# Patient Record
Sex: FEMALE | Race: WHITE | NOT HISPANIC OR LATINO | Employment: FULL TIME | URBAN - METROPOLITAN AREA
[De-identification: names, ages, dates, MRNs, and addresses within clinical notes are randomized per-mention and may not be internally consistent; named-entity substitution may affect disease eponyms.]

---

## 2019-01-13 ENCOUNTER — OFFICE VISIT (OUTPATIENT)
Dept: URGENT CARE | Facility: CLINIC | Age: 34
End: 2019-01-13
Payer: MEDICAID

## 2019-01-13 VITALS
DIASTOLIC BLOOD PRESSURE: 82 MMHG | SYSTOLIC BLOOD PRESSURE: 111 MMHG | BODY MASS INDEX: 35.7 KG/M2 | HEIGHT: 62 IN | HEART RATE: 84 BPM | TEMPERATURE: 98 F | OXYGEN SATURATION: 99 % | WEIGHT: 194 LBS

## 2019-01-13 DIAGNOSIS — H66.001 ACUTE SUPPURATIVE OTITIS MEDIA OF RIGHT EAR WITHOUT SPONTANEOUS RUPTURE OF TYMPANIC MEMBRANE, RECURRENCE NOT SPECIFIED: Primary | ICD-10-CM

## 2019-01-13 PROCEDURE — 99204 PR OFFICE/OUTPT VISIT, NEW, LEVL IV, 45-59 MIN: ICD-10-PCS | Mod: S$GLB,,, | Performed by: PHYSICIAN ASSISTANT

## 2019-01-13 PROCEDURE — 99204 OFFICE O/P NEW MOD 45 MIN: CPT | Mod: S$GLB,,, | Performed by: PHYSICIAN ASSISTANT

## 2019-01-13 RX ORDER — CETIRIZINE HYDROCHLORIDE, PSEUDOEPHEDRINE HYDROCHLORIDE 5; 120 MG/1; MG/1
1 TABLET, FILM COATED, EXTENDED RELEASE ORAL DAILY
Qty: 14 TABLET | Refills: 0 | Status: SHIPPED | OUTPATIENT
Start: 2019-01-13 | End: 2019-01-27

## 2019-01-13 RX ORDER — AMOXICILLIN AND CLAVULANATE POTASSIUM 875; 125 MG/1; MG/1
1 TABLET, FILM COATED ORAL 2 TIMES DAILY
Qty: 20 TABLET | Refills: 0 | Status: SHIPPED | OUTPATIENT
Start: 2019-01-13 | End: 2019-01-23

## 2019-01-13 RX ORDER — DEXAMETHASONE SODIUM PHOSPHATE 100 MG/10ML
10 INJECTION INTRAMUSCULAR; INTRAVENOUS
Status: COMPLETED | OUTPATIENT
Start: 2019-01-13 | End: 2019-01-13

## 2019-01-13 RX ADMIN — DEXAMETHASONE SODIUM PHOSPHATE 10 MG: 100 INJECTION INTRAMUSCULAR; INTRAVENOUS at 04:01

## 2019-01-13 NOTE — PROGRESS NOTES
"Subjective:       Patient ID: Janki Bernstein is a 33 y.o. female.    Vitals:  height is 5' 2" (1.575 m) and weight is 88 kg (194 lb). Her oral temperature is 97.6 °F (36.4 °C). Her blood pressure is 111/82 and her pulse is 84. Her oxygen saturation is 99%.     Chief Complaint: Otalgia    Otalgia    There is pain in the right ear. This is a new problem. Episode onset: 2 weeks ago. The problem occurs constantly. The problem has been unchanged. There has been no fever. The patient is experiencing no pain. Associated symptoms include hearing loss. Pertinent negatives include no abdominal pain, coughing, diarrhea, headaches, neck pain, rash, sore throat or vomiting. She has tried ear drops for the symptoms. The treatment provided no relief.       Constitution: Negative for chills, fatigue and fever.   HENT: Positive for ear pain and hearing loss. Negative for congestion and sore throat.    Neck: Negative for neck pain and painful lymph nodes.   Cardiovascular: Negative for chest pain and leg swelling.   Eyes: Negative for double vision and blurred vision.   Respiratory: Negative for cough and shortness of breath.    Gastrointestinal: Negative for abdominal pain, nausea, vomiting and diarrhea.   Genitourinary: Negative for dysuria, frequency, urgency and history of kidney stones.   Musculoskeletal: Negative for joint pain, joint swelling, muscle cramps and muscle ache.   Skin: Negative for color change, pale, rash and bruising.   Allergic/Immunologic: Negative for seasonal allergies.   Neurological: Negative for dizziness, history of vertigo, light-headedness, passing out and headaches.   Hematologic/Lymphatic: Negative for swollen lymph nodes.   Psychiatric/Behavioral: Negative for nervous/anxious, sleep disturbance and depression. The patient is not nervous/anxious.        Objective:      Physical Exam   Constitutional: She is oriented to person, place, and time. She appears well-developed and well-nourished. She " is cooperative.  Non-toxic appearance. She does not have a sickly appearance. She does not appear ill. No distress.   HENT:   Head: Normocephalic and atraumatic.   Right Ear: Hearing, external ear and ear canal normal. Tympanic membrane is erythematous. A middle ear effusion is present.   Left Ear: Hearing, tympanic membrane, external ear and ear canal normal.   Nose: Nose normal. No mucosal edema, rhinorrhea or nasal deformity. No epistaxis. Right sinus exhibits no maxillary sinus tenderness and no frontal sinus tenderness. Left sinus exhibits no maxillary sinus tenderness and no frontal sinus tenderness.   Mouth/Throat: Uvula is midline, oropharynx is clear and moist and mucous membranes are normal. No trismus in the jaw. Normal dentition. No uvula swelling. No oropharyngeal exudate, posterior oropharyngeal edema or posterior oropharyngeal erythema.   Eyes: Conjunctivae, EOM and lids are normal. Pupils are equal, round, and reactive to light. No scleral icterus.   Sclera clear bilat   Neck: Trachea normal, full passive range of motion without pain and phonation normal. Neck supple. No neck rigidity. No edema and no erythema present.   Cardiovascular: Normal rate, regular rhythm, normal heart sounds, intact distal pulses and normal pulses.   Pulmonary/Chest: Effort normal and breath sounds normal. No respiratory distress. She has no decreased breath sounds. She has no wheezes. She has no rhonchi. She has no rales.   Abdominal: Soft. Normal appearance and bowel sounds are normal. She exhibits no distension. There is no tenderness.   Musculoskeletal: Normal range of motion. She exhibits no edema or deformity.   Lymphadenopathy:     She has no cervical adenopathy.   Neurological: She is alert and oriented to person, place, and time. She exhibits normal muscle tone. Coordination normal.   Skin: Skin is warm, dry and intact. She is not diaphoretic. No pallor.   Psychiatric: She has a normal mood and affect. Her speech  is normal and behavior is normal. Judgment and thought content normal. Cognition and memory are normal.   Nursing note and vitals reviewed.      Assessment:       1. Acute suppurative otitis media of right ear without spontaneous rupture of tympanic membrane, recurrence not specified        Plan:         Acute suppurative otitis media of right ear without spontaneous rupture of tympanic membrane, recurrence not specified  -     amoxicillin-clavulanate 875-125mg (AUGMENTIN) 875-125 mg per tablet; Take 1 tablet by mouth 2 (two) times daily. for 10 days  Dispense: 20 tablet; Refill: 0  -     dexamethasone injection 10 mg  -     cetirizine-pseudoephedrine 5-120 mg Tb12; Take 1 tablet by mouth once daily. for 14 days  Dispense: 14 tablet; Refill: 0      Patient Instructions   · Follow up with your primary care in 2-5 days if symptoms have not improved, or you may return here.  · If you were referred to a specialist, please follow up with that specialty.  · If you were prescribed antibiotics, please take them to completion.  · If you were prescribed a narcotic or any medication with sedative effects, do not drive or operate heavy equipment or machinery while taking these medications.  · You must understand that you have received treatment at an Urgent Care facility only, and that you may be released before all of your medical problems are known or treated. Urgent Care facilities are not equipped to handle life threatening emergencies. It is recommended that you go to an Emergency Department for further evaluation of worsening or concerning symptoms, or possibly life threatening conditions as discussed.                                        If you  smoke, please stop smoking        Symptomatic treatment:    Alternate tylenol and motrin every 4-6 hours  salt water gargles  Cold-eeze helps to reduce the duration of sore throat symptoms  Cepachol helps to numb the discomfort  Chloroseptic spray  Nasal saline spray reduces  inflammation and dryness  Warm face compresses as often as you can  Vicks vapor rub at night  Flonase OTC or Nasacort OTC  Simple foods like chicken noodle soup help  Pedialyte helps with dehydration if lacking appetite  Rest as much as you can

## 2019-08-14 PROBLEM — E55.9 VITAMIN D DEFICIENCY: Status: ACTIVE | Noted: 2019-08-14

## 2019-09-02 ENCOUNTER — OFFICE VISIT (OUTPATIENT)
Dept: URGENT CARE | Facility: CLINIC | Age: 34
End: 2019-09-02
Payer: MEDICAID

## 2019-09-02 VITALS
RESPIRATION RATE: 20 BRPM | BODY MASS INDEX: 35.88 KG/M2 | OXYGEN SATURATION: 97 % | SYSTOLIC BLOOD PRESSURE: 121 MMHG | TEMPERATURE: 97 F | HEART RATE: 73 BPM | DIASTOLIC BLOOD PRESSURE: 85 MMHG | HEIGHT: 62 IN | WEIGHT: 195 LBS

## 2019-09-02 DIAGNOSIS — J01.40 ACUTE NON-RECURRENT PANSINUSITIS: ICD-10-CM

## 2019-09-02 DIAGNOSIS — J02.9 ACUTE PHARYNGITIS, UNSPECIFIED ETIOLOGY: Primary | ICD-10-CM

## 2019-09-02 PROCEDURE — 99214 PR OFFICE/OUTPT VISIT, EST, LEVL IV, 30-39 MIN: ICD-10-PCS | Mod: S$GLB,,, | Performed by: FAMILY MEDICINE

## 2019-09-02 PROCEDURE — 99214 OFFICE O/P EST MOD 30 MIN: CPT | Mod: S$GLB,,, | Performed by: FAMILY MEDICINE

## 2019-09-02 RX ORDER — DEXAMETHASONE SODIUM PHOSPHATE 100 MG/10ML
5 INJECTION INTRAMUSCULAR; INTRAVENOUS
Status: COMPLETED | OUTPATIENT
Start: 2019-09-02 | End: 2019-09-02

## 2019-09-02 RX ORDER — CEFDINIR 300 MG/1
300 CAPSULE ORAL 2 TIMES DAILY
Qty: 20 CAPSULE | Refills: 0 | Status: SHIPPED | OUTPATIENT
Start: 2019-09-02 | End: 2019-09-12

## 2019-09-02 RX ADMIN — DEXAMETHASONE SODIUM PHOSPHATE 5 MG: 100 INJECTION INTRAMUSCULAR; INTRAVENOUS at 01:09

## 2019-09-02 NOTE — PROGRESS NOTES
"Subjective:       Patient ID: Janki Bernstein is a 33 y.o. female.    Vitals:  height is 5' 2" (1.575 m) and weight is 88.5 kg (195 lb). Her tympanic temperature is 97.4 °F (36.3 °C). Her blood pressure is 121/85 and her pulse is 73. Her respiration is 20 and oxygen saturation is 97%.     Chief Complaint: Sinus Problem    Sinus Problem   This is a new problem. The current episode started 1 to 4 weeks ago (1 week). The problem has been gradually worsening since onset. There has been no fever. Her pain is at a severity of 0/10. She is experiencing no pain. Associated symptoms include congestion, coughing, ear pain, headaches, sinus pressure and sneezing. Pertinent negatives include no chills, diaphoresis, hoarse voice, neck pain, shortness of breath, sore throat or swollen glands. Treatments tried: DayQuil, NyQuil. The treatment provided no relief.       Constitution: Negative for chills, sweating, fatigue and fever.   HENT: Positive for ear pain, congestion, sinus pain and sinus pressure. Negative for postnasal drip, sore throat and voice change.    Neck: negative. Negative for neck pain and painful lymph nodes.   Cardiovascular: Negative.    Eyes: Negative for eye redness.   Respiratory: Positive for cough and sputum production. Negative for chest tightness, bloody sputum, COPD, shortness of breath, stridor, wheezing and asthma.    Gastrointestinal: Negative for nausea and vomiting.   Endocrine: negative.   Genitourinary: Negative.    Musculoskeletal: Negative.  Negative for muscle ache.   Skin: Negative for rash.   Allergic/Immunologic: Positive for sneezing. Negative for seasonal allergies and asthma.   Neurological: Positive for headaches.   Hematologic/Lymphatic: Negative for swollen lymph nodes.   Psychiatric/Behavioral: Negative.        Objective:      Physical Exam   Constitutional: She is oriented to person, place, and time. She appears well-developed and well-nourished. She is cooperative.  Non-toxic " appearance. She does not appear ill. No distress.   HENT:   Head: Normocephalic and atraumatic.   Right Ear: Hearing, tympanic membrane, external ear and ear canal normal.   Left Ear: Hearing, tympanic membrane, external ear and ear canal normal.   Nose: No mucosal edema, rhinorrhea or nasal deformity. No epistaxis. Right sinus exhibits maxillary sinus tenderness and frontal sinus tenderness. Left sinus exhibits maxillary sinus tenderness and frontal sinus tenderness.   Mouth/Throat: Uvula is midline and mucous membranes are normal. No trismus in the jaw. Normal dentition. No uvula swelling. Posterior oropharyngeal edema and posterior oropharyngeal erythema present.   Eyes: Conjunctivae and lids are normal. No scleral icterus.   Neck: Trachea normal, full passive range of motion without pain and phonation normal. Neck supple.   Cardiovascular: Normal rate, regular rhythm, normal heart sounds, intact distal pulses and normal pulses.   Pulmonary/Chest: Effort normal and breath sounds normal. No respiratory distress.   Abdominal: Soft. Normal appearance and bowel sounds are normal. She exhibits no distension. There is no tenderness.   Musculoskeletal: Normal range of motion. She exhibits no edema or deformity.   Neurological: She is alert and oriented to person, place, and time. She exhibits normal muscle tone. Coordination normal.   Skin: Skin is warm, dry and intact. She is not diaphoretic. No pallor.   Psychiatric: She has a normal mood and affect. Her speech is normal and behavior is normal. Judgment and thought content normal. Cognition and memory are normal.   Nursing note and vitals reviewed.      Assessment:       1. Acute pharyngitis, unspecified etiology    2. Acute non-recurrent pansinusitis        Plan:         Acute pharyngitis, unspecified etiology    Acute non-recurrent pansinusitis  -     cefdinir (OMNICEF) 300 MG capsule; Take 1 capsule (300 mg total) by mouth 2 (two) times daily. for 10 days   Dispense: 20 capsule; Refill: 0  -     pseudoephedrine-DM-guaifenesin (POLY-VENT DM) 60- mg Tab; Take 1 tablet by mouth every 6 (six) hours as needed.  Dispense: 20 tablet; Refill: 0  -     dexamethasone injection 5 mg    Please drink plenty of fluids.  Please get plenty of rest.  Please return here or go to the Emergency Department for any concerns or worsening of condition.  If you were given wait & see antibiotics, please wait 3-5 days before taking them, and only take them if your symptoms have worsened or not improved.  If you do begin taking the antibiotics, please take them to completion.  If you were prescribed antibiotics, please take them to completion.  If you were prescribed a narcotic medication, do not drive or operate heavy equipment or machinery while taking these medications.    You were given a decongestant (RESCON or POLY VENT Dm).  If your insurance does not cover it or you cannot afford it, it is ok to use the over the counter products listed below.  If you do not have Hypertension or any history of palpitations, it is ok to take over the counter Sudafed or Mucinex D or Allegra-D or Claritin-D or Zyrtec-D.  If you do take one of the above, it is ok to combine that with plain over the counter Mucinex or Allegra or Claritin or Zyrtec.  If for example you are taking Zyrtec -D, you can combine that with Mucinex, but not Mucinex-D.  If you are taking Mucinex-D, you can combine that with plain Allegra or Claritin or Zyrtec.   If you do have Hypertension or palpitations, it is safe to take Coricidin HBP for relief of sinus symptoms.    We recommend you take over the counter Flonase (Fluticasone) or another nasally inhaled steroid unless you are already taking one.  Nasal irrigation with a saline spray or Netti Pot like device per their directions is also recommended.  If not allergic, please take over the counter Tylenol (Acetaminophen) and/or Motrin (Ibuprofen) as directed for control of pain  and/or fever.    Robitussin DM 2 teas every 4 hours as needed for cough.  If you  smoke, please stop smoking.    You were given an injection of steroid.  This will relieve swelling and inflammation and improve respiratory symptoms.  It can raise your blood sugar if you are diabetic.    Please follow up with your primary care doctor or specialist as needed.  Primary Doctor No  None      You must understand that you have received treatment at an Urgent Care facility only, and that you may be  released before all of your medical problems are known or treated. Urgent Care facilities are not equipped to  handle life threatening emergencies. It is recommended that you seek care at an Emergency Department for  further evaluation of worsening or concerning symptoms, or possibly life threatening conditions as  discussed.

## 2019-09-02 NOTE — LETTER
September 2, 2019  Janki Bernstein  [address] [place] [address]                [facility] [place]  [address], [address]  [place] [address]  Phone: [phone]  Fax: [phone] Janki Bernstein was seen and treated in our Urgent Care department   on 9/2/2019. She may return to work in 2 - 3 days.      If you have any questions or concerns, please don't hesitate to call.    Sincerely,        Andry Umana MD

## 2019-09-02 NOTE — PATIENT INSTRUCTIONS
Please drink plenty of fluids.  Please get plenty of rest.  Please return here or go to the Emergency Department for any concerns or worsening of condition.  If you were given wait & see antibiotics, please wait 3-5 days before taking them, and only take them if your symptoms have worsened or not improved.  If you do begin taking the antibiotics, please take them to completion.  If you were prescribed antibiotics, please take them to completion.  If you were prescribed a narcotic medication, do not drive or operate heavy equipment or machinery while taking these medications.    You were given a decongestant (RESCON or POLY VENT Dm).  If your insurance does not cover it or you cannot afford it, it is ok to use the over the counter products listed below.  If you do not have Hypertension or any history of palpitations, it is ok to take over the counter Sudafed or Mucinex D or Allegra-D or Claritin-D or Zyrtec-D.  If you do take one of the above, it is ok to combine that with plain over the counter Mucinex or Allegra or Claritin or Zyrtec.  If for example you are taking Zyrtec -D, you can combine that with Mucinex, but not Mucinex-D.  If you are taking Mucinex-D, you can combine that with plain Allegra or Claritin or Zyrtec.   If you do have Hypertension or palpitations, it is safe to take Coricidin HBP for relief of sinus symptoms.    We recommend you take over the counter Flonase (Fluticasone) or another nasally inhaled steroid unless you are already taking one.  Nasal irrigation with a saline spray or Netti Pot like device per their directions is also recommended.  If not allergic, please take over the counter Tylenol (Acetaminophen) and/or Motrin (Ibuprofen) as directed for control of pain and/or fever.    Robitussin DM 2 teas every 4 hours as needed for cough.  If you  smoke, please stop smoking.    Please follow up with your primary care doctor or specialist as needed.  Primary Doctor No  None    You must  understand that you have received treatment at an Urgent Care facility only, and that you may be  released before all of your medical problems are known or treated. Urgent Care facilities are not equipped to  handle life threatening emergencies. It is recommended that you seek care at an Emergency Department for  further evaluation of worsening or concerning symptoms, or possibly life threatening conditions as  discussed.    Pharyngitis: Strep (Presumed)    You have pharyngitis (sore throat). The cause is thought to be the streptococcus, or strep, bacterium. Strep throat infection can cause throat pain that is worse when swallowing, aching all over, headache, and fever. The infection may be spread by coughing, kissing, or touching others after touching your mouth or nose. Antibiotic medications are given to treat the infection.  Home care  · Rest at home. Drink plenty of fluids to avoid dehydration.  · No work or school for the first 2 days of taking the antibiotics. After this time, you will not be contagious. You can then return to work or school if you are feeling better.   · The antibiotic medication must be taken for the full 10 days, even if you feel better. This is very important to ensure the infection is treated. It is also important to prevent drug-resistant organisms from developing. If you were given an antibiotic shot, no more antibiotics are needed.  · You may use acetaminophen or ibuprofen to control pain or fever, unless another medicine was prescribed for this. If you have chronic liver or kidney disease or ever had a stomach ulcer or GI bleeding, talk with your doctor before using these medicines.  · Throat lozenges or a throat-numbing sprays can help reduce throat pain. Gargling with warm salt water can also help. Dissolve 1/2 teaspoon of salt in 1 8 ounce glass of warm water.   · Avoid salty or spicy foods, which can irritate the throat.  Follow-up care  Follow up with your healthcare provider or  our staff if you are not improving over the next week.  When to seek medical advice  Call your healthcare provider right away if any of these occur:  · Fever as directed by your doctor.   · New or worsening ear pain, sinus pain, or headache  · Painful lumps in the back of neck  · Stiff neck  · Lymph nodes are getting larger  · Inability to swallow liquids, excessive drooling, or inability to open mouth wide due to throat pain  · Signs of dehydration (very dark urine or no urine, sunken eyes, dizziness)  · Trouble breathing or noisy breathing  · Muffled voice  · New rash  Date Last Reviewed: 4/13/2015 © 2000-2016 [employer]. [address] [place] [address], [place] [address]. All rights reserved. This information is not intended as a substitute for professional medical care. Always follow your healthcare professional's instructions.      Acute Bacterial Rhinosinusitis (ABRS)  Acute bacterial rhinosinusitis (ABRS) is an infection of your nasal cavity and sinuses. Its caused by bacteria. Acute means that youve had symptoms for less than 12 weeks.  Understanding your sinuses  The nasal cavity is the large air-filled space behind your nose. The sinuses are a group of spaces formed by the bones of your face. They connect with your nasal cavity. ABRS causes the tissue lining these spaces to become inflamed. Mucus may not drain normally. This leads to facial pain and other symptoms.  What causes ABRS?  ABRS most often follows an upper respiratory infection caused by a virus. Bacteria then infect the lining of your nasal cavity and sinuses. But you can also get ABRS if you have:  · Nasal allergies  · Long-term nasal swelling and congestion not caused by allergies  · Blockage in the nose  Symptoms of ABRS  The symptoms of ABRS may be different for each person, and can include:  · Nasal congestion  · Runny nose  · Fluid draining from the nose down the throat (postnasal drip)  · Headache  · Cough  · Pain in the  sinuses  · Thick, colored fluid from the nose (mucus)  · Fever  Diagnosing ABRS  ABRS may be diagnosed if youve had an upper respiratory infection like a cold and cough for longer than 10 to 14 days. Your health care provider will ask about your symptoms and your medical history. The provider will check your vital signs, including your temperature. Youll have a physical exam. The health care provider will check your ears, nose, and throat. You likely wont need any tests. If ABRS comes back, you may have a culture or other tests.  Treatment for ABRS  Treatment may include:  · Antibiotic medicine. This is for symptoms that last for at least 10 to 14 days.  · Nasal corticosteroid medicine. Drops or spray used in the nose can lessen swelling and congestion.  · Over-the-counter pain medicine. This is to lessen sinus pain and pressure.  · Nasal decongestant medicine. Spray or drops may help to lessen congestion. Do not use them for more than a few days.  · Salt wash (saline irrigation). This can help to loosen mucus.  Possible complications of ABRS  ABRS may come back or become long-term (chronic).  In rare cases, ABRS may cause complications such as:   · Inflamed tissue around the brain and spinal cord (meningitis)  · Inflamed tissue around the eyes (orbital cellulitis)  · Inflamed bones around the sinuses (osteitis)  These problems may need to be treated in a hospital with intravenous (IV) antibiotic medicine or surgery.  When to call the health care provider  Call your health care provider if you have any of the following:  · Symptoms that dont get better, or get worse  · Symptoms that dont get better after 3 to 5 days on antibiotics  · Trouble seeing  · Swelling around your eyes  · Confusion or trouble staying awake   Date Last Reviewed: 3/3/2015  © 6861-9135 [employer]. [address] [place] [address], [place] [address]. All rights reserved. This information is not intended as a substitute for professional  medical care. Always follow your healthcare professional's instructions.

## 2019-10-11 ENCOUNTER — OFFICE VISIT (OUTPATIENT)
Dept: URGENT CARE | Facility: CLINIC | Age: 34
End: 2019-10-11
Payer: MEDICAID

## 2019-10-11 VITALS
HEIGHT: 62 IN | BODY MASS INDEX: 36.25 KG/M2 | WEIGHT: 197 LBS | DIASTOLIC BLOOD PRESSURE: 93 MMHG | SYSTOLIC BLOOD PRESSURE: 132 MMHG | OXYGEN SATURATION: 99 % | RESPIRATION RATE: 20 BRPM | TEMPERATURE: 98 F | HEART RATE: 98 BPM

## 2019-10-11 DIAGNOSIS — H65.03 BILATERAL ACUTE SEROUS OTITIS MEDIA, RECURRENCE NOT SPECIFIED: ICD-10-CM

## 2019-10-11 DIAGNOSIS — J06.9 VIRAL URI WITH COUGH: Primary | ICD-10-CM

## 2019-10-11 PROCEDURE — 99214 OFFICE O/P EST MOD 30 MIN: CPT | Mod: S$GLB,,, | Performed by: PHYSICIAN ASSISTANT

## 2019-10-11 PROCEDURE — 99214 PR OFFICE/OUTPT VISIT, EST, LEVL IV, 30-39 MIN: ICD-10-PCS | Mod: S$GLB,,, | Performed by: PHYSICIAN ASSISTANT

## 2019-10-11 RX ORDER — PREDNISONE 10 MG/1
10 TABLET ORAL DAILY
Qty: 4 TABLET | Refills: 0 | Status: SHIPPED | OUTPATIENT
Start: 2019-10-11 | End: 2019-10-15

## 2019-10-11 RX ORDER — FLUTICASONE PROPIONATE 50 MCG
2 SPRAY, SUSPENSION (ML) NASAL DAILY
Qty: 16 G | Refills: 0 | Status: SHIPPED | OUTPATIENT
Start: 2019-10-11 | End: 2019-11-06

## 2019-10-11 RX ORDER — DEXAMETHASONE SODIUM PHOSPHATE 100 MG/10ML
5 INJECTION INTRAMUSCULAR; INTRAVENOUS
Status: COMPLETED | OUTPATIENT
Start: 2019-10-11 | End: 2019-10-11

## 2019-10-11 RX ORDER — BROMPHENIRAMINE MALEATE, PSEUDOEPHEDRINE HYDROCHLORIDE, AND DEXTROMETHORPHAN HYDROBROMIDE 2; 30; 10 MG/5ML; MG/5ML; MG/5ML
10 SYRUP ORAL EVERY 6 HOURS PRN
Qty: 120 ML | Refills: 0 | Status: SHIPPED | OUTPATIENT
Start: 2019-10-11 | End: 2019-10-14

## 2019-10-11 RX ADMIN — DEXAMETHASONE SODIUM PHOSPHATE 5 MG: 100 INJECTION INTRAMUSCULAR; INTRAVENOUS at 03:10

## 2019-10-11 NOTE — PROGRESS NOTES
"Subjective:       Patient ID: Janki Bernstein is a 33 y.o. female.    Vitals:  height is 5' 2" (1.575 m) and weight is 89.4 kg (197 lb). Her oral temperature is 98 °F (36.7 °C). Her blood pressure is 132/93 (abnormal) and her pulse is 98. Her respiration is 20 and oxygen saturation is 99%.     Chief Complaint: Sinus Problem    Patient complains of a RN, congestion, PND, sneezing, ear popping, sore throat, cough, headache, and sinus pressure/pain for the past 6 days.  Patient denies fever and chills.  Patient also denies improvement with OTC meds.  Patient denies any other complaints at this time.       Sinus Problem   This is a new problem. The current episode started in the past 7 days. The problem has been gradually worsening since onset. There has been no fever. Associated symptoms include congestion, coughing, ear pain (popping), headaches, sinus pressure, sneezing and a sore throat. Pertinent negatives include no chills, diaphoresis or shortness of breath. Past treatments include oral decongestants. The treatment provided mild relief.       Constitution: Negative for chills, sweating, fatigue and fever.   HENT: Positive for ear pain (popping), congestion, postnasal drip, sinus pressure and sore throat. Negative for sinus pain and voice change.    Neck: Negative for painful lymph nodes.   Cardiovascular: Negative for chest pain.   Eyes: Negative for eye redness.   Respiratory: Positive for cough. Negative for chest tightness, sputum production, bloody sputum, COPD, shortness of breath, stridor, wheezing and asthma.    Gastrointestinal: Negative for abdominal pain, nausea, vomiting and diarrhea.   Musculoskeletal: Negative for muscle ache.   Skin: Negative for rash.   Allergic/Immunologic: Positive for sneezing. Negative for seasonal allergies and asthma.   Neurological: Positive for headaches.   Hematologic/Lymphatic: Negative for swollen lymph nodes.       Objective:      Physical Exam   Constitutional: She " is oriented to person, place, and time. She appears well-developed and well-nourished. She is cooperative.  Non-toxic appearance. She does not have a sickly appearance. She does not appear ill. No distress.   HENT:   Head: Normocephalic and atraumatic.   Right Ear: Hearing, external ear and ear canal normal. A middle ear effusion (mild) is present.   Left Ear: Hearing, external ear and ear canal normal. A middle ear effusion (mild) is present.   Nose: Mucosal edema present. No rhinorrhea, sinus tenderness or nasal deformity. No epistaxis. Right sinus exhibits no maxillary sinus tenderness and no frontal sinus tenderness. Left sinus exhibits no maxillary sinus tenderness and no frontal sinus tenderness.   Mouth/Throat: Uvula is midline and mucous membranes are normal. No trismus in the jaw. Normal dentition. No uvula swelling. Posterior oropharyngeal erythema (mild) present. No oropharyngeal exudate or posterior oropharyngeal edema. No tonsillar exudate.   Eyes: Conjunctivae and lids are normal. No scleral icterus.   Neck: Trachea normal, full passive range of motion without pain and phonation normal. Neck supple. No neck rigidity. No edema and no erythema present.   Cardiovascular: Normal rate, regular rhythm, normal heart sounds, intact distal pulses and normal pulses.   Pulmonary/Chest: Effort normal and breath sounds normal. No respiratory distress. She has no decreased breath sounds. She has no rhonchi.   Abdominal: Normal appearance.   Musculoskeletal: Normal range of motion. She exhibits no edema or deformity.   Neurological: She is alert and oriented to person, place, and time. She exhibits normal muscle tone. Coordination normal.   Skin: Skin is warm, dry, intact, not diaphoretic and not pale.   Psychiatric: She has a normal mood and affect. Her speech is normal and behavior is normal. Judgment and thought content normal. Cognition and memory are normal.   Nursing note and vitals reviewed.        Assessment:        1. Viral URI with cough    2. Bilateral acute serous otitis media, recurrence not specified        Plan:         Viral URI with cough  -     dexamethasone injection 5 mg  -     predniSONE (DELTASONE) 10 MG tablet; Take 1 tablet (10 mg total) by mouth once daily. Start tomorrow. for 4 days  Dispense: 4 tablet; Refill: 0  -     fluticasone propionate (FLONASE) 50 mcg/actuation nasal spray; 2 sprays (100 mcg total) by Each Nostril route once daily.  Dispense: 16 g; Refill: 0  -     brompheniramine-pseudoeph-DM (BROMFED DM) 2-30-10 mg/5 mL Syrp; Take 10 mLs by mouth every 6 (six) hours as needed.  Dispense: 120 mL; Refill: 0    Bilateral acute serous otitis media, recurrence not specified  -     dexamethasone injection 5 mg  -     predniSONE (DELTASONE) 10 MG tablet; Take 1 tablet (10 mg total) by mouth once daily. Start tomorrow. for 4 days  Dispense: 4 tablet; Refill: 0  -     fluticasone propionate (FLONASE) 50 mcg/actuation nasal spray; 2 sprays (100 mcg total) by Each Nostril route once daily.  Dispense: 16 g; Refill: 0      Patient Instructions   You have been diagnosed with a viral illness. Antibiotics will not help your infection to go away any faster.  You immune system must fight this illness.  You will likely have symptoms for 7-10 days as this is how long a typical virus lasts.  Below are a few things that you can do at home to help yourself feel better in the mean time.     1.  For patients above 6 months of age who are not allergic to and are not on anticoagulants, you can alternate Tylenol and Motrin every 4-6 hours for fever above 100.4F and/or pain.  For patients less than 6 months of age, allergic to or intolerant to NSAIDS, have gastritis, gastric ulcers, or history of GI bleeds, are pregnant, or are on anticoagulant therapy, you can take Tylenol every 4 hours as needed for fever above 100.4F and/or pain.     2.  Rest and keep yourself well hydrated.  Drink hot liquids (coffee, water, tea, hot  chocolate, or soup) 10-12 times a day for 5-7 days.  Put liquid in a mug and place in microwave for 2.5 - 3 minutes. Pour hot liquid into another mug not used to microwave the liquid (to avoid burning your mouth) then sniff the steam from the cup and sip the heated liquid.    3.  You can use these over the counter medications/remedies to help with your symptoms:     Runny Nose:  Use an antihistamine such as Claritin, Zyrtec or Allegra to help dry you out.     Congestion:  Use pseudoephedrine (behind the counter) for congestion- Pseudoephedrine 30 mg up to 240 mg /day. Warning:  It can raise your blood pressure and give you palpitations.  Coricidin HBP is okay to use if you have high blood pressure.     Use mucinex (guaifenisin) up to 2400mg/day to break up/loosen any mucous. MucinexDM has a cough suppressant that can be used for cough and at night to stop the tickle in the back of your throat.    Use Nasal Saline to mechanically move any post nasal drip from your eustachian tubes or from the back of your throat.    Use Afrin in each nare, for no longer than 3 days, as it is addictive. It can also dry out your mucous membranes and cause elevated blood pressure.    Use Flonase 1-2 sprays/nostril per day. It is a local acting steroid nasal spray.  If you develop a bloody nose, stop using the medication immediately.    Sore throat:  Use warm, salt water gargles to ease your throat pain- 1/2 tsp salt to 1 cup warm water, gargle as desired.  Chloraseptic sprays and throat lozenges will also help to ease throat pain.     Sometimes Nyquil at night is beneficial to help you get some rest; however, it is sedating and does contain an antihistamine and Tylenol.  Make sure not to double up on these medications.      These things will help you to feel better and will speed your recovery.  If your condition fails to improve in a timely manner, you should receive another evaluation by your Primary Care Provider/Pediatrician to  discuss your concerns or return to urgent care for a recheck.  If your condition worsens at any time, you should report immediately to your nearest Emergency Department for further evaluation. **You must understand that you have received Urgent Care treatment only and that you may be released before all of your medical problems are known or treated. You, the patient, are responsible to arrange for follow-up care as instructed.             Viral Upper Respiratory Illness (Adult)  You have a viral upper respiratory illness (URI), which is another term for the common cold. This illness is contagious during the first few days. It is spread through the air by coughing and sneezing. It may also be spread by direct contact (touching the sick person and then touching your own eyes, nose, or mouth). Frequent handwashing will decrease risk of spread. Most viral illnesses go away within 7 to 10 days with rest and simple home remedies. Sometimes the illness may last for several weeks. Antibiotics will not kill a virus, and they are generally not prescribed for this condition.    Home care  · If symptoms are severe, rest at home for the first 2 to 3 days. When you resume activity, don't let yourself get too tired.  · Avoid being exposed to cigarette smoke (yours or others).  · You may use acetaminophen or ibuprofen to control pain and fever, unless another medicine was prescribed. (Note: If you have chronic liver or kidney disease, have ever had a stomach ulcer or gastrointestinal bleeding, or are taking blood-thinning medicines, talk with your healthcare provider before using these medicines.) Aspirin should never be given to anyone under 18 years of age who is ill with a viral infection or fever. It may cause severe liver or brain damage.  · Your appetite may be poor, so a light diet is fine. Avoid dehydration by drinking 6 to 8 glasses of fluids per day (water, soft drinks, juices, tea, or soup). Extra fluids will help  loosen secretions in the nose and lungs.  · Over-the-counter cold medicines will not shorten the length of time youre sick, but they may be helpful for the following symptoms: cough, sore throat, and nasal and sinus congestion. (Note: Do not use decongestants if you have high blood pressure.)  Follow-up care  Follow up with your healthcare provider, or as advised.  When to seek medical advice  Call your healthcare provider right away if any of these occur:  · Cough with lots of colored sputum (mucus)  · Severe headache; face, neck, or ear pain  · Difficulty swallowing due to throat pain  · Fever of 100.4°F (38°C)  Call 911, or get immediate medical care  Call emergency services right away if any of these occur:  · Chest pain, shortness of breath, wheezing, or difficulty breathing  · Coughing up blood  · Inability to swallow due to throat pain  Date Last Reviewed: 9/13/2015  © 9290-2455 [employer]. [address] [place] [address], [place] [address]. All rights reserved. This information is not intended as a substitute for professional medical care. Always follow your healthcare professional's instructions.

## 2019-10-11 NOTE — PATIENT INSTRUCTIONS
You have been diagnosed with a viral illness. Antibiotics will not help your infection to go away any faster.  You immune system must fight this illness.  You will likely have symptoms for 7-10 days as this is how long a typical virus lasts.  Below are a few things that you can do at home to help yourself feel better in the mean time.     1.  For patients above 6 months of age who are not allergic to and are not on anticoagulants, you can alternate Tylenol and Motrin every 4-6 hours for fever above 100.4F and/or pain.  For patients less than 6 months of age, allergic to or intolerant to NSAIDS, have gastritis, gastric ulcers, or history of GI bleeds, are pregnant, or are on anticoagulant therapy, you can take Tylenol every 4 hours as needed for fever above 100.4F and/or pain.     2.  Rest and keep yourself well hydrated.  Drink hot liquids (coffee, water, tea, hot chocolate, or soup) 10-12 times a day for 5-7 days.  Put liquid in a mug and place in microwave for 2.5 - 3 minutes. Pour hot liquid into another mug not used to microwave the liquid (to avoid burning your mouth) then sniff the steam from the cup and sip the heated liquid.    3.  You can use these over the counter medications/remedies to help with your symptoms:     Runny Nose:  Use an antihistamine such as Claritin, Zyrtec or Allegra to help dry you out.     Congestion:  Use pseudoephedrine (behind the counter) for congestion- Pseudoephedrine 30 mg up to 240 mg /day. Warning:  It can raise your blood pressure and give you palpitations.  Coricidin HBP is okay to use if you have high blood pressure.     Use mucinex (guaifenisin) up to 2400mg/day to break up/loosen any mucous. MucinexDM has a cough suppressant that can be used for cough and at night to stop the tickle in the back of your throat.    Use Nasal Saline to mechanically move any post nasal drip from your eustachian tubes or from the back of your throat.    Use Afrin in each nare, for no longer  than 3 days, as it is addictive. It can also dry out your mucous membranes and cause elevated blood pressure.    Use Flonase 1-2 sprays/nostril per day. It is a local acting steroid nasal spray.  If you develop a bloody nose, stop using the medication immediately.    Sore throat:  Use warm, salt water gargles to ease your throat pain- 1/2 tsp salt to 1 cup warm water, gargle as desired.  Chloraseptic sprays and throat lozenges will also help to ease throat pain.     Sometimes Nyquil at night is beneficial to help you get some rest; however, it is sedating and does contain an antihistamine and Tylenol.  Make sure not to double up on these medications.      These things will help you to feel better and will speed your recovery.  If your condition fails to improve in a timely manner, you should receive another evaluation by your Primary Care Provider/Pediatrician to discuss your concerns or return to urgent care for a recheck.  If your condition worsens at any time, you should report immediately to your nearest Emergency Department for further evaluation. **You must understand that you have received Urgent Care treatment only and that you may be released before all of your medical problems are known or treated. You, the patient, are responsible to arrange for follow-up care as instructed.             Viral Upper Respiratory Illness (Adult)  You have a viral upper respiratory illness (URI), which is another term for the common cold. This illness is contagious during the first few days. It is spread through the air by coughing and sneezing. It may also be spread by direct contact (touching the sick person and then touching your own eyes, nose, or mouth). Frequent handwashing will decrease risk of spread. Most viral illnesses go away within 7 to 10 days with rest and simple home remedies. Sometimes the illness may last for several weeks. Antibiotics will not kill a virus, and they are generally not prescribed for this  condition.    Home care  · If symptoms are severe, rest at home for the first 2 to 3 days. When you resume activity, don't let yourself get too tired.  · Avoid being exposed to cigarette smoke (yours or others).  · You may use acetaminophen or ibuprofen to control pain and fever, unless another medicine was prescribed. (Note: If you have chronic liver or kidney disease, have ever had a stomach ulcer or gastrointestinal bleeding, or are taking blood-thinning medicines, talk with your healthcare provider before using these medicines.) Aspirin should never be given to anyone under 18 years of age who is ill with a viral infection or fever. It may cause severe liver or brain damage.  · Your appetite may be poor, so a light diet is fine. Avoid dehydration by drinking 6 to 8 glasses of fluids per day (water, soft drinks, juices, tea, or soup). Extra fluids will help loosen secretions in the nose and lungs.  · Over-the-counter cold medicines will not shorten the length of time youre sick, but they may be helpful for the following symptoms: cough, sore throat, and nasal and sinus congestion. (Note: Do not use decongestants if you have high blood pressure.)  Follow-up care  Follow up with your healthcare provider, or as advised.  When to seek medical advice  Call your healthcare provider right away if any of these occur:  · Cough with lots of colored sputum (mucus)  · Severe headache; face, neck, or ear pain  · Difficulty swallowing due to throat pain  · Fever of 100.4°F (38°C)  Call 911, or get immediate medical care  Call emergency services right away if any of these occur:  · Chest pain, shortness of breath, wheezing, or difficulty breathing  · Coughing up blood  · Inability to swallow due to throat pain  Date Last Reviewed: 9/13/2015  © 4237-7441 [employer]. [address] [place] [address], [place] [address]. All rights reserved. This information is not intended as a substitute for professional medical care.  Always follow your healthcare professional's instructions.

## 2019-11-12 ENCOUNTER — PATIENT MESSAGE (OUTPATIENT)
Dept: OBSTETRICS AND GYNECOLOGY | Facility: CLINIC | Age: 34
End: 2019-11-12

## 2020-01-31 ENCOUNTER — OFFICE VISIT (OUTPATIENT)
Dept: URGENT CARE | Facility: CLINIC | Age: 35
End: 2020-01-31
Payer: MEDICAID

## 2020-01-31 VITALS
BODY MASS INDEX: 36.99 KG/M2 | RESPIRATION RATE: 18 BRPM | HEART RATE: 81 BPM | HEIGHT: 62 IN | SYSTOLIC BLOOD PRESSURE: 130 MMHG | WEIGHT: 201 LBS | TEMPERATURE: 99 F | DIASTOLIC BLOOD PRESSURE: 95 MMHG | OXYGEN SATURATION: 99 %

## 2020-01-31 DIAGNOSIS — J02.9 ACUTE PHARYNGITIS, UNSPECIFIED ETIOLOGY: Primary | ICD-10-CM

## 2020-01-31 DIAGNOSIS — J01.40 ACUTE NON-RECURRENT PANSINUSITIS: ICD-10-CM

## 2020-01-31 PROCEDURE — 99214 PR OFFICE/OUTPT VISIT, EST, LEVL IV, 30-39 MIN: ICD-10-PCS | Mod: 25,S$GLB,, | Performed by: FAMILY MEDICINE

## 2020-01-31 PROCEDURE — 99214 OFFICE O/P EST MOD 30 MIN: CPT | Mod: 25,S$GLB,, | Performed by: FAMILY MEDICINE

## 2020-01-31 RX ORDER — DEXAMETHASONE SODIUM PHOSPHATE 100 MG/10ML
5 INJECTION INTRAMUSCULAR; INTRAVENOUS
Status: COMPLETED | OUTPATIENT
Start: 2020-01-31 | End: 2020-01-31

## 2020-01-31 RX ORDER — CEFDINIR 300 MG/1
300 CAPSULE ORAL 2 TIMES DAILY
Qty: 20 CAPSULE | Refills: 0 | Status: SHIPPED | OUTPATIENT
Start: 2020-01-31 | End: 2020-02-10

## 2020-01-31 RX ADMIN — DEXAMETHASONE SODIUM PHOSPHATE 5 MG: 100 INJECTION INTRAMUSCULAR; INTRAVENOUS at 02:01

## 2020-01-31 NOTE — PROGRESS NOTES
"Subjective:       Patient ID: Janki Mix is a 34 y.o. female.    Vitals:  height is 5' 2" (1.575 m) and weight is 91.2 kg (201 lb). Her oral temperature is 98.7 °F (37.1 °C). Her blood pressure is 130/95 (abnormal) and her pulse is 81. Her respiration is 18 and oxygen saturation is 99%.     Chief Complaint: Otalgia    Otalgia    There is pain in both ears. This is a new problem. The current episode started yesterday. The problem occurs constantly. The problem has been gradually worsening. There has been no fever. The pain is at a severity of 7/10. The pain is moderate. Associated symptoms include coughing, headaches and a sore throat. Pertinent negatives include no rash or vomiting.       Constitution: Negative for chills, sweating, fatigue and fever.   HENT: Positive for ear pain, congestion, postnasal drip, sinus pressure and sore throat. Negative for sinus pain and voice change.    Neck: Negative for painful lymph nodes.   Eyes: Negative for eye redness.   Respiratory: Positive for cough. Negative for chest tightness, sputum production, bloody sputum, COPD, shortness of breath, stridor, wheezing and asthma.    Gastrointestinal: Negative for nausea and vomiting.   Musculoskeletal: Negative for muscle ache.   Skin: Negative for rash.   Allergic/Immunologic: Negative for seasonal allergies and asthma.   Neurological: Positive for headaches.   Hematologic/Lymphatic: Negative for swollen lymph nodes.       Objective:      Physical Exam   Constitutional: She is oriented to person, place, and time. She appears well-developed and well-nourished. She is cooperative.  Non-toxic appearance. She does not have a sickly appearance. She does not appear ill. No distress.   HENT:   Head: Normocephalic and atraumatic.   Right Ear: Hearing, tympanic membrane, external ear and ear canal normal.   Left Ear: Hearing, tympanic membrane, external ear and ear canal normal.   Nose: No mucosal edema, rhinorrhea or nasal " deformity. No epistaxis. Right sinus exhibits maxillary sinus tenderness and frontal sinus tenderness. Left sinus exhibits maxillary sinus tenderness and frontal sinus tenderness.   Mouth/Throat: Uvula is midline and mucous membranes are normal. No trismus in the jaw. Normal dentition. No uvula swelling. Posterior oropharyngeal edema and posterior oropharyngeal erythema present. No oropharyngeal exudate.   Eyes: Conjunctivae and lids are normal. No scleral icterus.   Neck: Trachea normal, full passive range of motion without pain and phonation normal. Neck supple. No neck rigidity. No edema and no erythema present.   Cardiovascular: Normal rate, regular rhythm, normal heart sounds, intact distal pulses and normal pulses.   Pulmonary/Chest: Effort normal and breath sounds normal. No respiratory distress. She has no decreased breath sounds. She has no rhonchi.   Abdominal: Normal appearance.   Musculoskeletal: Normal range of motion. She exhibits no edema or deformity.   Neurological: She is alert and oriented to person, place, and time. She exhibits normal muscle tone. Coordination normal.   Skin: Skin is warm, dry, intact, not diaphoretic and not pale.   Psychiatric: She has a normal mood and affect. Her speech is normal and behavior is normal. Judgment and thought content normal. Cognition and memory are normal.   Nursing note and vitals reviewed.        Assessment:       1. Acute pharyngitis, unspecified etiology    2. Acute non-recurrent pansinusitis        Plan:         Acute pharyngitis, unspecified etiology    Acute non-recurrent pansinusitis  -     cefdinir (OMNICEF) 300 MG capsule; Take 1 capsule (300 mg total) by mouth 2 (two) times daily. for 10 days  Dispense: 20 capsule; Refill: 0  -     pseudoephedrine-DM-guaifenesin (POLY-VENT DM) 60- mg Tab; Take 1 tablet by mouth every 6 (six) hours as needed.  Dispense: 20 tablet; Refill: 0  -     dexamethasone injection 5 mg     Please drink plenty of  fluids.  Please get plenty of rest.  Please return here or go to the Emergency Department for any concerns or worsening of condition.  If you were given wait & see antibiotics, please wait 3-5 days before taking them, and only take them if your symptoms have worsened or not improved.  If you do begin taking the antibiotics, please take them to completion.  If you were prescribed antibiotics, please take them to completion.  If you were prescribed a narcotic medication, do not drive or operate heavy equipment or machinery while taking these medications.    You were given a decongestant (RESCON or POLY VENT Dm).  If your insurance does not cover it or you cannot afford it, it is ok to use the over the counter products listed below.  If you do not have Hypertension or any history of palpitations, it is ok to take over the counter Sudafed or Mucinex D or Allegra-D or Claritin-D or Zyrtec-D.  If you do take one of the above, it is ok to combine that with plain over the counter Mucinex or Allegra or Claritin or Zyrtec.  If for example you are taking Zyrtec -D, you can combine that with Mucinex, but not Mucinex-D.  If you are taking Mucinex-D, you can combine that with plain Allegra or Claritin or Zyrtec.   If you do have Hypertension or palpitations, it is safe to take Coricidin HBP for relief of sinus symptoms.    We recommend you take over the counter Flonase (Fluticasone) or another nasally inhaled steroid unless you are already taking one.  Nasal irrigation with a saline spray or Netti Pot like device per their directions is also recommended.  If not allergic, please take over the counter Tylenol (Acetaminophen) and/or Motrin (Ibuprofen) as directed for control of pain and/or fever.    Robitussin DM 2 teas every 4 hours as needed for cough.  If you  smoke, please stop smoking.    You were given an injection of steroid.  This will relieve swelling and inflammation and improve respiratory symptoms.  It can raise your  blood sugar if you are diabetic.    Please follow up with your primary care doctor or specialist as needed.  Primary Doctor No  None    You must understand that you have received treatment at an Urgent Care facility only, and that you may be  released before all of your medical problems are known or treated. Urgent Care facilities are not equipped to  handle life threatening emergencies. It is recommended that you seek care at an Emergency Department for  further evaluation of worsening or concerning symptoms, or possibly life threatening conditions as  discussed.

## 2020-01-31 NOTE — LETTER
January 31, 2020  Janki Mix  [address] [place] [address]                [facility] [place] [facility]  [address], [address]  [place] [address]  Phone: [phone]  Fax: [phone] Janki Mix was seen and treated in our Urgent Care department on 1/31/2020. She may return to work in 2 - 3 days.      If you have any questions or concerns, please don't hesitate to call.        Sincerely,        Andry Umana MD

## 2020-01-31 NOTE — PATIENT INSTRUCTIONS
Please drink plenty of fluids.  Please get plenty of rest.  Please return here or go to the Emergency Department for any concerns or worsening of condition.  If you were given wait & see antibiotics, please wait 3-5 days before taking them, and only take them if your symptoms have worsened or not improved.  If you do begin taking the antibiotics, please take them to completion.  If you were prescribed antibiotics, please take them to completion.  If you were prescribed a narcotic medication, do not drive or operate heavy equipment or machinery while taking these medications.    You were given a decongestant (RESCON or POLY VENT Dm).  If your insurance does not cover it or you cannot afford it, it is ok to use the over the counter products listed below.  If you do not have Hypertension or any history of palpitations, it is ok to take over the counter Sudafed or Mucinex D or Allegra-D or Claritin-D or Zyrtec-D.  If you do take one of the above, it is ok to combine that with plain over the counter Mucinex or Allegra or Claritin or Zyrtec.  If for example you are taking Zyrtec -D, you can combine that with Mucinex, but not Mucinex-D.  If you are taking Mucinex-D, you can combine that with plain Allegra or Claritin or Zyrtec.   If you do have Hypertension or palpitations, it is safe to take Coricidin HBP for relief of sinus symptoms.    We recommend you take over the counter Flonase (Fluticasone) or another nasally inhaled steroid unless you are already taking one.  Nasal irrigation with a saline spray or Netti Pot like device per their directions is also recommended.  If not allergic, please take over the counter Tylenol (Acetaminophen) and/or Motrin (Ibuprofen) as directed for control of pain and/or fever.    Robitussin DM 2 teas every 4 hours as needed for cough.  If you  smoke, please stop smoking.    You were given an injection of steroid.  This will relieve swelling and inflammation and improve respiratory  symptoms.  It can raise your blood sugar if you are diabetic.    Please follow up with your primary care doctor or specialist as needed.  Primary Doctor No  None    You must understand that you have received treatment at an Urgent Care facility only, and that you may be  released before all of your medical problems are known or treated. Urgent Care facilities are not equipped to  handle life threatening emergencies. It is recommended that you seek care at an Emergency Department for  further evaluation of worsening or concerning symptoms, or possibly life threatening conditions as  discussed.    Acute Bacterial Rhinosinusitis (ABRS)  Acute bacterial rhinosinusitis (ABRS) is an infection of your nasal cavity and sinuses. Its caused by bacteria. Acute means that youve had symptoms for less than 12 weeks.  Understanding your sinuses  The nasal cavity is the large air-filled space behind your nose. The sinuses are a group of spaces formed by the bones of your face. They connect with your nasal cavity. ABRS causes the tissue lining these spaces to become inflamed. Mucus may not drain normally. This leads to facial pain and other symptoms.  What causes ABRS?  ABRS most often follows an upper respiratory infection caused by a virus. Bacteria then infect the lining of your nasal cavity and sinuses. But you can also get ABRS if you have:  · Nasal allergies  · Long-term nasal swelling and congestion not caused by allergies  · Blockage in the nose  Symptoms of ABRS  The symptoms of ABRS may be different for each person, and can include:  · Nasal congestion  · Runny nose  · Fluid draining from the nose down the throat (postnasal drip)  · Headache  · Cough  · Pain in the sinuses  · Thick, colored fluid from the nose (mucus)  · Fever  Diagnosing ABRS  ABRS may be diagnosed if youve had an upper respiratory infection like a cold and cough for longer than 10 to 14 days. Your health care provider will ask about your symptoms and  your medical history. The provider will check your vital signs, including your temperature. Youll have a physical exam. The health care provider will check your ears, nose, and throat. You likely wont need any tests. If ABRS comes back, you may have a culture or other tests.  Treatment for ABRS  Treatment may include:  · Antibiotic medicine. This is for symptoms that last for at least 10 to 14 days.  · Nasal corticosteroid medicine. Drops or spray used in the nose can lessen swelling and congestion.  · Over-the-counter pain medicine. This is to lessen sinus pain and pressure.  · Nasal decongestant medicine. Spray or drops may help to lessen congestion. Do not use them for more than a few days.  · Salt wash (saline irrigation). This can help to loosen mucus.  Possible complications of ABRS  ABRS may come back or become long-term (chronic).  In rare cases, ABRS may cause complications such as:   · Inflamed tissue around the brain and spinal cord (meningitis)  · Inflamed tissue around the eyes (orbital cellulitis)  · Inflamed bones around the sinuses (osteitis)  These problems may need to be treated in a hospital with intravenous (IV) antibiotic medicine or surgery.  When to call the health care provider  Call your health care provider if you have any of the following:  · Symptoms that dont get better, or get worse  · Symptoms that dont get better after 3 to 5 days on antibiotics  · Trouble seeing  · Swelling around your eyes  · Confusion or trouble staying awake   Date Last Reviewed: 3/3/2015  © 6214-2867 [employer]. [address] [place] [address], [place] [address]. All rights reserved. This information is not intended as a substitute for professional medical care. Always follow your healthcare professional's instructions.      Pharyngitis: Strep (Presumed)    You have pharyngitis (sore throat). The cause is thought to be the streptococcus, or strep, bacterium. Strep throat infection can cause throat pain  that is worse when swallowing, aching all over, headache, and fever. The infection may be spread by coughing, kissing, or touching others after touching your mouth or nose. Antibiotic medications are given to treat the infection.  Home care  · Rest at home. Drink plenty of fluids to avoid dehydration.  · No work or school for the first 2 days of taking the antibiotics. After this time, you will not be contagious. You can then return to work or school if you are feeling better.   · The antibiotic medication must be taken for the full 10 days, even if you feel better. This is very important to ensure the infection is treated. It is also important to prevent drug-resistant organisms from developing. If you were given an antibiotic shot, no more antibiotics are needed.  · You may use acetaminophen or ibuprofen to control pain or fever, unless another medicine was prescribed for this. If you have chronic liver or kidney disease or ever had a stomach ulcer or GI bleeding, talk with your doctor before using these medicines.  · Throat lozenges or a throat-numbing sprays can help reduce throat pain. Gargling with warm salt water can also help. Dissolve 1/2 teaspoon of salt in 1 8 ounce glass of warm water.   · Avoid salty or spicy foods, which can irritate the throat.  Follow-up care  Follow up with your healthcare provider or our staff if you are not improving over the next week.  When to seek medical advice  Call your healthcare provider right away if any of these occur:  · Fever as directed by your doctor.   · New or worsening ear pain, sinus pain, or headache  · Painful lumps in the back of neck  · Stiff neck  · Lymph nodes are getting larger  · Inability to swallow liquids, excessive drooling, or inability to open mouth wide due to throat pain  · Signs of dehydration (very dark urine or no urine, sunken eyes, dizziness)  · Trouble breathing or noisy breathing  · Muffled voice  · New rash  Date Last Reviewed:  4/13/2015  © 6362-9376 [employer]. [address] [place] [address], [place] [address]. All rights reserved. This information is not intended as a substitute for professional medical care. Always follow your healthcare professional's instructions.

## 2020-03-17 PROBLEM — Z01.818 PREOP EXAMINATION: Status: ACTIVE | Noted: 2020-03-17

## 2020-05-18 PROBLEM — Z90.79 STATUS POST BILATERAL SALPINGECTOMY: Status: ACTIVE | Noted: 2020-05-18

## 2020-05-18 PROBLEM — Z01.818 PREOP EXAMINATION: Status: RESOLVED | Noted: 2020-03-17 | Resolved: 2020-05-18

## 2020-05-22 PROBLEM — R10.2 PELVIC PAIN IN FEMALE: Status: ACTIVE | Noted: 2020-05-22

## 2020-10-19 ENCOUNTER — OFFICE VISIT (OUTPATIENT)
Dept: URGENT CARE | Facility: CLINIC | Age: 35
End: 2020-10-19
Payer: MEDICAID

## 2020-10-19 VITALS
RESPIRATION RATE: 18 BRPM | HEART RATE: 88 BPM | OXYGEN SATURATION: 96 % | BODY MASS INDEX: 34.96 KG/M2 | DIASTOLIC BLOOD PRESSURE: 85 MMHG | HEIGHT: 62 IN | WEIGHT: 190 LBS | TEMPERATURE: 99 F | SYSTOLIC BLOOD PRESSURE: 120 MMHG

## 2020-10-19 DIAGNOSIS — J02.9 ACUTE PHARYNGITIS, UNSPECIFIED ETIOLOGY: Primary | ICD-10-CM

## 2020-10-19 DIAGNOSIS — H66.92 LEFT OTITIS MEDIA, UNSPECIFIED OTITIS MEDIA TYPE: ICD-10-CM

## 2020-10-19 DIAGNOSIS — H60.502 ACUTE OTITIS EXTERNA OF LEFT EAR, UNSPECIFIED TYPE: ICD-10-CM

## 2020-10-19 PROCEDURE — 99214 PR OFFICE/OUTPT VISIT, EST, LEVL IV, 30-39 MIN: ICD-10-PCS | Mod: S$GLB,,, | Performed by: FAMILY MEDICINE

## 2020-10-19 PROCEDURE — 99214 OFFICE O/P EST MOD 30 MIN: CPT | Mod: S$GLB,,, | Performed by: FAMILY MEDICINE

## 2020-10-19 RX ORDER — CEFDINIR 300 MG/1
300 CAPSULE ORAL 2 TIMES DAILY
Qty: 20 CAPSULE | Refills: 0 | Status: SHIPPED | OUTPATIENT
Start: 2020-10-19 | End: 2020-10-29

## 2020-10-19 RX ORDER — NAPROXEN 500 MG/1
500 TABLET ORAL 2 TIMES DAILY WITH MEALS
Qty: 20 TABLET | Refills: 0 | Status: SHIPPED | OUTPATIENT
Start: 2020-10-19 | End: 2021-03-15

## 2020-10-19 RX ORDER — CIPROFLOXACIN AND DEXAMETHASONE 3; 1 MG/ML; MG/ML
4 SUSPENSION/ DROPS AURICULAR (OTIC) 2 TIMES DAILY
Qty: 5 ML | Refills: 0 | Status: SHIPPED | OUTPATIENT
Start: 2020-10-19 | End: 2021-03-15

## 2020-10-19 RX ORDER — DEXTROMETHORPHAN HBR, GUAIFENESIN AND PSEUDOEPHEDRINE HCL 60; 380; 20 MG/1; MG/1; MG/1
1 TABLET ORAL EVERY 6 HOURS PRN
Qty: 20 TABLET | Refills: 0 | Status: SHIPPED | OUTPATIENT
Start: 2020-10-19 | End: 2021-03-15

## 2020-10-19 NOTE — PATIENT INSTRUCTIONS
Please drink plenty of fluids.  Please get plenty of rest.  Please return here or go to the Emergency Department for any concerns or worsening of condition.  If you were given wait & see antibiotics, please wait 3-5 days before taking them, and only take them if your symptoms have worsened or not improved.  If you do begin taking the antibiotics, please take them to completion.  If you were prescribed antibiotics, please take them to completion.  If you were prescribed a narcotic medication, do not drive or operate heavy equipment or machinery while taking these medications.    You were given a decongestant (RESCON or POLY VENT Dm).  If your insurance does not cover it or you cannot afford it, it is ok to use the over the counter products listed below.  If you do not have Hypertension or any history of palpitations, it is ok to take over the counter Sudafed or Mucinex D or Allegra-D or Claritin-D or Zyrtec-D.  If you do take one of the above, it is ok to combine that with plain over the counter Mucinex or Allegra or Claritin or Zyrtec.  If for example you are taking Zyrtec -D, you can combine that with Mucinex, but not Mucinex-D.  If you are taking Mucinex-D, you can combine that with plain Allegra or Claritin or Zyrtec.   If you do have Hypertension or palpitations, it is safe to take Coricidin HBP for relief of sinus symptoms.    We recommend you take over the counter Flonase (Fluticasone) or another nasally inhaled steroid unless you are already taking one.  Nasal irrigation with a saline spray or Netti Pot like device per their directions is also recommended.  If not allergic, please take over the counter Tylenol (Acetaminophen) and/or Motrin (Ibuprofen) as directed for control of pain and/or fever.    Robitussin DM 2 teas every 4 hours as needed for cough.  If you  smoke, please stop smoking.    Please follow up with your primary care doctor or specialist as needed.  Primary Doctor No  None    You must  understand that you have received treatment at an Urgent Care facility only, and that you may be  released before all of your medical problems are known or treated. Urgent Care facilities are not equipped to  handle life threatening emergencies. It is recommended that you seek care at an Emergency Department for  further evaluation of worsening or concerning symptoms, or possibly life threatening conditions as  discussed.    Pharyngitis: Strep (Presumed)    You have pharyngitis (sore throat). The cause is thought to be the streptococcus, or strep, bacterium. Strep throat infection can cause throat pain that is worse when swallowing, aching all over, headache, and fever. The infection may be spread by coughing, kissing, or touching others after touching your mouth or nose. Antibiotic medications are given to treat the infection.  Home care  · Rest at home. Drink plenty of fluids to avoid dehydration.  · No work or school for the first 2 days of taking the antibiotics. After this time, you will not be contagious. You can then return to work or school if you are feeling better.   · The antibiotic medication must be taken for the full 10 days, even if you feel better. This is very important to ensure the infection is treated. It is also important to prevent drug-resistant organisms from developing. If you were given an antibiotic shot, no more antibiotics are needed.  · You may use acetaminophen or ibuprofen to control pain or fever, unless another medicine was prescribed for this. If you have chronic liver or kidney disease or ever had a stomach ulcer or GI bleeding, talk with your doctor before using these medicines.  · Throat lozenges or a throat-numbing sprays can help reduce throat pain. Gargling with warm salt water can also help. Dissolve 1/2 teaspoon of salt in 1 8 ounce glass of warm water.   · Avoid salty or spicy foods, which can irritate the throat.  Follow-up care  Follow up with your healthcare provider or  our staff if you are not improving over the next week.  When to seek medical advice  Call your healthcare provider right away if any of these occur:  · Fever as directed by your doctor.   · New or worsening ear pain, sinus pain, or headache  · Painful lumps in the back of neck  · Stiff neck  · Lymph nodes are getting larger  · Inability to swallow liquids, excessive drooling, or inability to open mouth wide due to throat pain  · Signs of dehydration (very dark urine or no urine, sunken eyes, dizziness)  · Trouble breathing or noisy breathing  · Muffled voice  · New rash  Date Last Reviewed: 4/13/2015 © 2000-2016 [employer]. [address] [place] [address], [place] [address]. All rights reserved. This information is not intended as a substitute for professional medical care. Always follow your healthcare professional's instructions.          Middle Ear Infection (Adult)  You have an infection of the middle ear, the space behind the eardrum. This is also called acute otitis media (AOM). Sometimes it is caused by the common cold. This is because congestion can block the internal passage (eustachian tube) that drains fluid from the middle ear. When the middle ear fills with fluid, bacteria can grow there and cause an infection. Oral antibiotics are used to treat this illness, not ear drops. Symptoms usually start to improve within 1 to 2 days of treatment.    Home care  The following are general care guidelines:  · Finish all of the antibiotic medicine given, even though you may feel better after the first few days.  · You may use over-the-counter medicine, such as acetaminophen or ibuprofen, to control pain and fever, unless something else was prescribed. If you have chronic liver or kidney disease or have ever had a stomach ulcer or gastrointestinal bleeding, talk with your healthcare provider before using these medicines. Do not give aspirin to anyone under 18 years of age who has a fever. It may cause severe  illness or death.  Follow-up care  Follow up with your healthcare provider, or as advised, in 2 weeks if all symptoms have not gotten better, or if hearing doesn't go back to normal within 1 month.  When to seek medical advice  Call your healthcare provider right away if any of these occur:  · Ear pain gets worse or does not improve after 3 days of treatment  · Unusual drowsiness or confusion  · Neck pain, stiff neck, or headache  · Fluid or blood draining from the ear canal  · Fever of 100.4°F (38°C) or as advised   · Seizure  Date Last Reviewed: 6/1/2016  © 5447-7788 [employer]. [address] [place] [address], [place] [address]. All rights reserved. This information is not intended as a substitute for professional medical care. Always follow your healthcare professional's instructions.      Please drink plenty of fluids.  Please get plenty of rest.  Please return here or go to the Emergency Department for any concerns or worsening of condition.  If you were given wait & see antibiotics, please wait 3-5 days before taking them, and only take them if your symptoms have worsened or not improved.  If you do begin taking the antibiotics, please take them to completion.  If you were prescribed antibiotics, please take them to completion.  If you were prescribed a narcotic medication, do not drive or operate heavy equipment or machinery while taking these medications.    Use Debrox drops to ears twice a week to prevent Cerumen (ear wax) Buildup.    If not allergic, please take over the counter Tylenol (Acetaminophen) and/or Motrin (Ibuprofen) as directed for control of pain and/or fever.    Please follow up with your primary care doctor or specialist as needed.  Primary Doctor No  None    You must understand that you have received treatment at an Urgent Care facility only, and that you may be  released before all of your medical problems are known or treated. Urgent Care facilities are not equipped to  handle life  threatening emergencies. It is recommended that you seek care at an Emergency Department for  further evaluation of worsening or concerning symptoms, or possibly life threatening conditions as  discussed.    External Ear Infection (Adult)    External otitis (also called swimmers ear) is an infection in the ear canal. It is often caused by bacteria or fungus. It can occur a few days after water gets trapped in the ear canal (from swimming or bathing). It can also occur after cleaning too deeply in the ear canal with a cotton swab or other object. Sometimes, hair care products get into the ear canal and cause this problem.  Symptoms can include pain, fever, itching, redness, drainage, or swelling of the ear canal. Temporary hearing loss may also occur.  Home care  · Do not try to clean the ear canal. This can push pus and bacteria deeper into the canal.  · Use prescribed ear drops as directed. These help reduce swelling and fight the infection. If an ear wick was placed in the ear canal, apply drops right onto the end of the wick. The wick will draw the medication into the ear canal even if it is swollen closed.  · A cotton ball may be loosely placed in the outer ear to absorb any drainage.  · You may use acetaminophen or ibuprofen to control pain, unless another medication was prescribed. Note: If you have chronic liver or kidney disease or ever had a stomach ulcer or GI bleeding, talk to your health care provider before taking any of these medications.  · Do not allow water to get into your ear when bathing. Also, avoid swimming until the infection has cleared.  Prevention  · Keep your ears dry. This helps lower the risk of infection. Dry your ears with a towel or hair dryer after getting wet. Also, use ear plugs when swimming.  · Do not stick any objects in the ear to remove wax.  · If you feel water trapped in your ear, use ear drops right away. You can get these drops over the counter at most drugstores. They  work by removing water from the ear canal.  Follow-up care  Follow up with your health care provider in one week, or as advised.  When to seek medical advice  Call your health care provider right away if any of these occur:  · Ear pain becomes worse or doesnt improve after 3 days of treatment  · Redness or swelling of the outer ear occurs or gets worse  · Headache  · Painful or stiff neck  · Drowsiness or confusion  · Fever of 100.4ºF (38ºC) or higher, or as directed by your health care provider  · Seizure  Date Last Reviewed: 3/22/2015  © 1889-1818 [employer]. [address] [place] [address], [place] [address]. All rights reserved. This information is not intended as a substitute for professional medical care. Always follow your healthcare professional's instructions.

## 2020-10-19 NOTE — PROGRESS NOTES
"Subjective:       Patient ID: Janki Mix is a 35 y.o. female.    Vitals:  height is 5' 2" (1.575 m) and weight is 86.2 kg (190 lb). Her temperature is 98.6 °F (37 °C). Her blood pressure is 120/85 and her pulse is 88. Her respiration is 18 and oxygen saturation is 96%.     Chief Complaint: EAR POPPING    Otalgia   There is pain in the right ear. This is a new problem. Episode onset: 1 week ago. There has been no fever. The pain is at a severity of 6/10. The pain is moderate. Associated symptoms include headaches, hearing loss (sounds cloudy) and vomiting. Pertinent negatives include no coughing, diarrhea, ear discharge, neck pain or sore throat. She has tried nothing for the symptoms. The treatment provided no relief.       Constitution: Negative for chills, sweating and fever.   HENT: Positive for ear pain (popping noise drinking or swallowing) and hearing loss (sounds cloudy). Negative for ear discharge, foreign body in ear, tinnitus, congestion, sinus pain and sore throat.    Neck: Negative for neck pain and neck stiffness.   Respiratory: Negative for cough.    Gastrointestinal: Positive for vomiting. Negative for nausea and diarrhea.   Genitourinary: Negative for missed menses.   Musculoskeletal: Negative for muscle ache.   Skin: Positive for lesion.   Neurological: Positive for dizziness, headaches and history of migraines. Negative for history of vertigo, light-headedness and coordination disturbances.       Objective:      Physical Exam   Constitutional: She is oriented to person, place, and time. She appears well-developed. She is cooperative.  Non-toxic appearance. She does not appear ill. No distress.   HENT:   Head: Normocephalic and atraumatic.   Ears:   Right Ear: Hearing, tympanic membrane, external ear and ear canal normal.   Left Ear: Hearing and external ear normal. There is swelling and tenderness. Tympanic membrane is erythematous and retracted.   Nose: No mucosal edema, rhinorrhea or " nasal deformity. No epistaxis. Right sinus exhibits no maxillary sinus tenderness and no frontal sinus tenderness. Left sinus exhibits no maxillary sinus tenderness and no frontal sinus tenderness.   Mouth/Throat: Uvula is midline and mucous membranes are normal. No trismus in the jaw. Normal dentition. No uvula swelling. Posterior oropharyngeal edema and posterior oropharyngeal erythema present. No oropharyngeal exudate.   Eyes: Conjunctivae and lids are normal. No scleral icterus.   Neck: Trachea normal, full passive range of motion without pain and phonation normal. Neck supple. No neck rigidity. No edema and no erythema present.   Cardiovascular: Normal rate, regular rhythm, normal heart sounds and normal pulses.   Pulmonary/Chest: Effort normal and breath sounds normal. No respiratory distress. She has no decreased breath sounds. She has no rhonchi.   Abdominal: Normal appearance.   Musculoskeletal: Normal range of motion.         General: No deformity.   Neurological: She is alert and oriented to person, place, and time. She exhibits normal muscle tone. Coordination normal.   Skin: Skin is warm, dry, intact, not diaphoretic and not pale. Psychiatric: Her speech is normal and behavior is normal. Judgment and thought content normal.   Nursing note and vitals reviewed.        Assessment:       1. Acute pharyngitis, unspecified etiology    2. Left otitis media, unspecified otitis media type    3. Acute otitis externa of left ear, unspecified type        Plan:         Acute pharyngitis, unspecified etiology    Left otitis media, unspecified otitis media type  -     cefdinir (OMNICEF) 300 MG capsule; Take 1 capsule (300 mg total) by mouth 2 (two) times daily. for 10 days  Dispense: 20 capsule; Refill: 0  -     pseudoephedrine-DM-guaifenesin (POLY-VENT DM) 60- mg Tab; Take 1 tablet by mouth every 6 (six) hours as needed.  Dispense: 20 tablet; Refill: 0  -     naproxen (NAPROSYN) 500 MG tablet; Take 1 tablet  (500 mg total) by mouth 2 (two) times daily with meals.  Dispense: 20 tablet; Refill: 0    Acute otitis externa of left ear, unspecified type  -     naproxen (NAPROSYN) 500 MG tablet; Take 1 tablet (500 mg total) by mouth 2 (two) times daily with meals.  Dispense: 20 tablet; Refill: 0  -     ciprofloxacin-dexamethasone 0.3-0.1% (CIPRODEX) 0.3-0.1 % DrpS; Place 4 drops into the right ear 2 (two) times daily.  Dispense: 5 mL; Refill: 0     Please drink plenty of fluids.  Please get plenty of rest.  Please return here or go to the Emergency Department for any concerns or worsening of condition.  If you were given wait & see antibiotics, please wait 3-5 days before taking them, and only take them if your symptoms have worsened or not improved.  If you do begin taking the antibiotics, please take them to completion.  If you were prescribed antibiotics, please take them to completion.  If you were prescribed a narcotic medication, do not drive or operate heavy equipment or machinery while taking these medications.    You were given a decongestant (RESCON or POLY VENT Dm).  If your insurance does not cover it or you cannot afford it, it is ok to use the over the counter products listed below.  If you do not have Hypertension or any history of palpitations, it is ok to take over the counter Sudafed or Mucinex D or Allegra-D or Claritin-D or Zyrtec-D.  If you do take one of the above, it is ok to combine that with plain over the counter Mucinex or Allegra or Claritin or Zyrtec.  If for example you are taking Zyrtec -D, you can combine that with Mucinex, but not Mucinex-D.  If you are taking Mucinex-D, you can combine that with plain Allegra or Claritin or Zyrtec.   If you do have Hypertension or palpitations, it is safe to take Coricidin HBP for relief of sinus symptoms.    We recommend you take over the counter Flonase (Fluticasone) or another nasally inhaled steroid unless you are already taking one.  Nasal irrigation with  a saline spray or Netti Pot like device per their directions is also recommended.  If not allergic, please take over the counter Tylenol (Acetaminophen) and/or Motrin (Ibuprofen) as directed for control of pain and/or fever.    Robitussin DM 2 teas every 4 hours as needed for cough.  If you  smoke, please stop smoking.      Please follow up with your primary care doctor or specialist as needed.  Primary Doctor No  None    You must understand that you have received treatment at an Urgent Care facility only, and that you may be  released before all of your medical problems are known or treated. Urgent Care facilities are not equipped to  handle life threatening emergencies. It is recommended that you seek care at an Emergency Department for  further evaluation of worsening or concerning symptoms, or possibly life threatening conditions as  discussed.      Please drink plenty of fluids.  Please get plenty of rest.  Please return here or go to the Emergency Department for any concerns or worsening of condition.  If you were given wait & see antibiotics, please wait 3-5 days before taking them, and only take them if your symptoms have worsened or not improved.  If you do begin taking the antibiotics, please take them to completion.  If you were prescribed antibiotics, please take them to completion.  If you were prescribed a narcotic medication, do not drive or operate heavy equipment or machinery while taking these medications.    Use Debrox drops to ears twice a week to prevent Cerumen (ear wax) Buildup.    If not allergic, please take over the counter Tylenol (Acetaminophen) and/or Motrin (Ibuprofen) as directed for control of pain and/or fever.    Please follow up with your primary care doctor or specialist as needed.  Primary Doctor No  None    You must understand that you have received treatment at an Urgent Care facility only, and that you may be  released before all of your medical problems are known or treated.  Urgent Care facilities are not equipped to  handle life threatening emergencies. It is recommended that you seek care at an Emergency Department for  further evaluation of worsening or concerning symptoms, or possibly life threatening conditions as  discussed.

## 2020-10-19 NOTE — LETTER
October 19, 2020  Janki Mix  [address] [place] [address]                [facility]  [address], [address]  [place] [address]  Phone: [phone]  Fax: [phone] Janki Mix was seen and treated in our Urgent Care department on 10/19/2020. She may return to work in 2 - 3 days.      If you have any questions or concerns, please don't hesitate to call.        Sincerely,        Andry Umana MD

## 2021-04-12 ENCOUNTER — OFFICE VISIT (OUTPATIENT)
Dept: URGENT CARE | Facility: CLINIC | Age: 36
End: 2021-04-12
Payer: MEDICAID

## 2021-04-12 VITALS
HEIGHT: 62 IN | RESPIRATION RATE: 16 BRPM | WEIGHT: 211 LBS | SYSTOLIC BLOOD PRESSURE: 126 MMHG | HEART RATE: 80 BPM | DIASTOLIC BLOOD PRESSURE: 92 MMHG | BODY MASS INDEX: 38.83 KG/M2 | TEMPERATURE: 99 F | OXYGEN SATURATION: 97 %

## 2021-04-12 DIAGNOSIS — R50.9 FEVER IN ADULT: Primary | ICD-10-CM

## 2021-04-12 DIAGNOSIS — B34.9 ACUTE VIRAL SYNDROME: ICD-10-CM

## 2021-04-12 LAB
CTP QC/QA: YES
MOLECULAR STREP A: NEGATIVE
POC MOLECULAR INFLUENZA A AGN: NEGATIVE
POC MOLECULAR INFLUENZA B AGN: NEGATIVE
SARS-COV-2 RDRP RESP QL NAA+PROBE: NEGATIVE

## 2021-04-12 PROCEDURE — 99214 OFFICE O/P EST MOD 30 MIN: CPT | Mod: S$GLB,,, | Performed by: NURSE PRACTITIONER

## 2021-04-12 PROCEDURE — 87651 STREP A DNA AMP PROBE: CPT | Mod: QW,S$GLB,, | Performed by: NURSE PRACTITIONER

## 2021-04-12 PROCEDURE — U0002: ICD-10-PCS | Mod: QW,S$GLB,, | Performed by: NURSE PRACTITIONER

## 2021-04-12 PROCEDURE — U0002 COVID-19 LAB TEST NON-CDC: HCPCS | Mod: QW,S$GLB,, | Performed by: NURSE PRACTITIONER

## 2021-04-12 PROCEDURE — 99214 PR OFFICE/OUTPT VISIT, EST, LEVL IV, 30-39 MIN: ICD-10-PCS | Mod: S$GLB,,, | Performed by: NURSE PRACTITIONER

## 2021-04-12 PROCEDURE — 87651 POCT STREP A MOLECULAR: ICD-10-PCS | Mod: QW,S$GLB,, | Performed by: NURSE PRACTITIONER

## 2021-04-12 PROCEDURE — 87502 POCT INFLUENZA A/B MOLECULAR: ICD-10-PCS | Mod: QW,S$GLB,, | Performed by: NURSE PRACTITIONER

## 2021-04-12 PROCEDURE — 87502 INFLUENZA DNA AMP PROBE: CPT | Mod: QW,S$GLB,, | Performed by: NURSE PRACTITIONER

## 2021-07-30 RX ORDER — FLUCONAZOLE 150 MG/1
150 TABLET ORAL DAILY
Qty: 1 TABLET | Refills: 0 | Status: SHIPPED | OUTPATIENT
Start: 2021-07-30 | End: 2021-07-31

## 2022-01-06 ENCOUNTER — PATIENT MESSAGE (OUTPATIENT)
Dept: ADMINISTRATIVE | Facility: OTHER | Age: 37
End: 2022-01-06
Payer: MEDICAID

## 2022-01-06 ENCOUNTER — OFFICE VISIT (OUTPATIENT)
Dept: URGENT CARE | Facility: CLINIC | Age: 37
End: 2022-01-06
Payer: MEDICAID

## 2022-01-06 VITALS
SYSTOLIC BLOOD PRESSURE: 133 MMHG | DIASTOLIC BLOOD PRESSURE: 94 MMHG | OXYGEN SATURATION: 95 % | TEMPERATURE: 102 F | RESPIRATION RATE: 18 BRPM | BODY MASS INDEX: 39.01 KG/M2 | WEIGHT: 212 LBS | HEART RATE: 109 BPM | HEIGHT: 62 IN

## 2022-01-06 DIAGNOSIS — U07.1 COVID-19 VIRUS DETECTED: Primary | ICD-10-CM

## 2022-01-06 DIAGNOSIS — Z11.59 SCREENING FOR VIRAL DISEASE: ICD-10-CM

## 2022-01-06 LAB
CTP QC/QA: YES
SARS-COV-2 RDRP RESP QL NAA+PROBE: POSITIVE

## 2022-01-06 PROCEDURE — 3008F BODY MASS INDEX DOCD: CPT | Mod: CPTII,S$GLB,, | Performed by: NURSE PRACTITIONER

## 2022-01-06 PROCEDURE — 3080F DIAST BP >= 90 MM HG: CPT | Mod: CPTII,S$GLB,, | Performed by: NURSE PRACTITIONER

## 2022-01-06 PROCEDURE — 1159F PR MEDICATION LIST DOCUMENTED IN MEDICAL RECORD: ICD-10-PCS | Mod: CPTII,S$GLB,, | Performed by: NURSE PRACTITIONER

## 2022-01-06 PROCEDURE — 3008F PR BODY MASS INDEX (BMI) DOCUMENTED: ICD-10-PCS | Mod: CPTII,S$GLB,, | Performed by: NURSE PRACTITIONER

## 2022-01-06 PROCEDURE — 3075F PR MOST RECENT SYSTOLIC BLOOD PRESS GE 130-139MM HG: ICD-10-PCS | Mod: CPTII,S$GLB,, | Performed by: NURSE PRACTITIONER

## 2022-01-06 PROCEDURE — 1159F MED LIST DOCD IN RCRD: CPT | Mod: CPTII,S$GLB,, | Performed by: NURSE PRACTITIONER

## 2022-01-06 PROCEDURE — 3075F SYST BP GE 130 - 139MM HG: CPT | Mod: CPTII,S$GLB,, | Performed by: NURSE PRACTITIONER

## 2022-01-06 PROCEDURE — U0002: ICD-10-PCS | Mod: QW,S$GLB,, | Performed by: NURSE PRACTITIONER

## 2022-01-06 PROCEDURE — 1160F RVW MEDS BY RX/DR IN RCRD: CPT | Mod: CPTII,S$GLB,, | Performed by: NURSE PRACTITIONER

## 2022-01-06 PROCEDURE — 3080F PR MOST RECENT DIASTOLIC BLOOD PRESSURE >= 90 MM HG: ICD-10-PCS | Mod: CPTII,S$GLB,, | Performed by: NURSE PRACTITIONER

## 2022-01-06 PROCEDURE — 1160F PR REVIEW ALL MEDS BY PRESCRIBER/CLIN PHARMACIST DOCUMENTED: ICD-10-PCS | Mod: CPTII,S$GLB,, | Performed by: NURSE PRACTITIONER

## 2022-01-06 PROCEDURE — 99213 PR OFFICE/OUTPT VISIT, EST, LEVL III, 20-29 MIN: ICD-10-PCS | Mod: S$GLB,,, | Performed by: NURSE PRACTITIONER

## 2022-01-06 PROCEDURE — U0002 COVID-19 LAB TEST NON-CDC: HCPCS | Mod: QW,S$GLB,, | Performed by: NURSE PRACTITIONER

## 2022-01-06 PROCEDURE — 99213 OFFICE O/P EST LOW 20 MIN: CPT | Mod: S$GLB,,, | Performed by: NURSE PRACTITIONER

## 2022-01-06 RX ORDER — BENZONATATE 100 MG/1
100 CAPSULE ORAL EVERY 6 HOURS PRN
Qty: 30 CAPSULE | Refills: 0 | Status: SHIPPED | OUTPATIENT
Start: 2022-01-06 | End: 2022-09-02

## 2022-01-06 RX ORDER — PROMETHAZINE HYDROCHLORIDE AND DEXTROMETHORPHAN HYDROBROMIDE 6.25; 15 MG/5ML; MG/5ML
5 SYRUP ORAL NIGHTLY PRN
Qty: 90 ML | Refills: 0 | Status: SHIPPED | OUTPATIENT
Start: 2022-01-06 | End: 2022-09-02

## 2022-01-06 NOTE — PATIENT INSTRUCTIONS
Patient Education       COVID-19 Discharge Instructions   About this topic   Coronavirus disease 2019 is also known as COVID-19. It is a viral illness that infects the lungs. It is caused by a virus called SARS-associated coronavirus (SARS-CoV-2).  The signs of COVID-19 most often start a few days after you have been infected. In some people, it takes longer to show signs. Others never show signs of the infection. You may have a cough, fever, shaking chills and it may be hard to breathe. You may be very tired, have muscle aches, a headache or sore throat. Some people have an upset stomach or loose stools. Others lose their sense of smell or taste. You may not have these signs all the time and they may come and go while you are sick.  The virus spreads easily through droplets when you talk, sneeze, or cough. You can pass the virus to others when you are talking close together, singing, hugging, sharing food, or shaking hands. Doctors believe the germs also survive on surfaces like tables, door handles, and telephones. However, this is not a common way that COVID-19 spreads. Doctors believe you can also spread the infection even if you dont have any symptoms, but they do not know how that happens. This is why getting vaccinated is one of the best ways to keep you healthy and slow the spread of the virus.  Some people have a mild case of COVID-19 and are able to stay at home and away from others until they feel better. Others may need to be in the hospital if they are very sick. Some people with COVID-19 can have some symptoms for weeks or months. People with COVID-19 must isolate themselves. You can start to be around others when your doctor says it is safe to do so.       What care is needed at home?   Ask your doctor what you need to do when you go home. Make sure you ask questions if you do not understand what the doctor says.  Drink lots of water, juice, or broth to replace fluids lost from a fever.  You may use  cool mist humidifiers to help ease congestion and coughing.  Use 2 to 3 pillows to prop yourself up when you lie down to make it easier to breathe and sleep.  Do not smoke and do not drink beer, wine, and mixed drinks (alcohol).  To lower the chance of passing the infection to others, get a COVID-19 vaccine after your infection has resolved.  If you have not been fully vaccinated:  Wear a mask over your mouth and nose if you are around others who are not sick. Cloth masks work best if they have more than one layer of fabric.  Wash your hands often.  Stay home in a separate room, if possible, away from others. Only go out to get medical care.  Use a separate bathroom if possible.  Do not make food for others.  What follow-up care is needed?   Your doctor may ask you to make visits to the office to check on your progress. Be sure to keep these visits. Make sure you wear a mask at these visits.  If you can, tell the staff you have COVID-19 ahead of time so they can take extra care to stop the disease from spreading.  It may take a few weeks before your health returns to normal.  What drugs may be needed?   The doctor may order drugs to:  Help with breathing  Help with fever  Help with swelling in your airways and lungs  Control coughing  Ease a sore throat  Help a runny or stuffy nose  Will physical activity be limited?   You may have to limit your physical activity. Talk to your doctor about the right amount of activity for you. If you have been very sick with COVID-19, it can take some time to get your strength back.  Will there be any other care needed?   Doctors do not know how long you can pass the virus on to others after you are sick. This is why it is important to stay in a separate room, if possible, when you are sick. For now, doctors are giving general guidelines for you to follow after you have been sick. Before you go around other people, you should:  Be fever free for 24 hours without taking any drugs to  lower the fever  Have no symptoms of cough or shortness of breath  Wait at least 10 days after first having symptoms or your first positive test, and you need to be symptom free as above. Some experts suggest waiting 20 days if you have had a more severe infection.  Talk with your doctor about getting a COVID-19 vaccine.  What problems could happen?   Fluid loss. This is dehydration.  Short-term or long-term lung damage  Heart problems  Death  When do I need to call the doctor?   You are having so much trouble breathing that you can only say one or two words at a time.  You need to sit upright at all times to be able to breathe and/or cannot lie down.  You are very confused or cannot stay awake.  Your lips or skin start to turn blue or grey.  You think you might be having a medical emergency. Some examples of medical emergencies are:  Severe chest pain.  Not able to speak or move normally.  You have trouble breathing when talking or sitting still.  You have new shortness of breath.  You become weak or dizzy.  You have very dark urine or do not pass urine for more than 8 hours.  You have new or worsening COVID-19 symptoms like:  Fever  Cough  Feeling very tired  Shaking chills  Headache  Trouble swallowing  Throwing up  Loose stools  Reddish purple spots on your fingers or toes  Teach Back: Helping You Understand   The Teach Back Method helps you understand the information we are giving you. After you talk with the staff, tell them in your own words what you learned. This helps to make sure the staff has described each thing clearly. It also helps to explain things that may have been confusing. Before going home, make sure you can do these:  I can tell you about my condition.  I can tell you what may help ease my breathing.  I can tell you what I can do to help avoid passing the infection to others.  I can tell you what I will do if I have trouble breathing; feel sleepy or confused; or my fingertips, fingernails,  skin, or lips are blue.  Where can I learn more?   Centers for Disease Control and Prevention  https://www.cdc.gov/coronavirus/2019-ncov/about/index.html   Centers for Disease Control and Prevention  https://www.cdc.gov/coronavirus/2019-ncov/hcp/disposition-in-home-patients.html   World Health Organization  https://www.who.int/news-room/q-a-detail/a-r-jaalsvlhybsnd   Last Reviewed Date   2021-10-05  Consumer Information Use and Disclaimer   This information is not specific medical advice and does not replace information you receive from your health care provider. This is only a brief summary of general information. It does NOT include all information about conditions, illnesses, injuries, tests, procedures, treatments, therapies, discharge instructions or life-style choices that may apply to you. You must talk with your health care provider for complete information about your health and treatment options. This information should not be used to decide whether or not to accept your health care providers advice, instructions or recommendations. Only your health care provider has the knowledge and training to provide advice that is right for you.  Copyright   Copyright © 2021 [employer] and its affiliates and/or licensors. All rights reserved.  ·   ·   ·   · Follow up with your primary care in 2-5 days if symptoms have not improved, or you may return here.  · If you were referred to a specialist, please follow up with that specialty.  · If you were prescribed antibiotics, please take them to completion.  · If you were prescribed a narcotic or any medication with sedative effects, do not drive or operate heavy equipment or machinery while taking these medications.  · You must understand that you have received treatment at an Urgent Care facility only, and that you may be released before all of your medical problems are known or treated. Urgent Care facilities are not equipped to handle life threatening emergencies.  It is recommended that you go to an Emergency Department for further evaluation of worsening or concerning symptoms, or possibly life threatening conditions as discussed.                                        If you  smoke, please stop smoking

## 2022-01-06 NOTE — PROGRESS NOTES
"Subjective:       Patient ID: Janki Mix is a 36 y.o. female.    Vitals:  height is 5' 2" (1.575 m) and weight is 96.2 kg (212 lb). Her temperature is 102.3 °F (39.1 °C) (abnormal). Her blood pressure is 133/94 (abnormal) and her pulse is 109. Her respiration is 18 and oxygen saturation is 95%.     Chief Complaint: Sore Throat    Scratchy throat yesterday, woke up 3am with sore throat and cough has gradually increased through day with nasal congestion, chills and fever.     Sore Throat   This is a new problem. The current episode started yesterday. The problem has been gradually worsening. Neither side of throat is experiencing more pain than the other. There has been no fever. The pain is mild. Associated symptoms include congestion, coughing and headaches. Pertinent negatives include no abdominal pain, diarrhea, ear pain, hoarse voice, neck pain, shortness of breath, swollen glands, trouble swallowing or vomiting. She has had no exposure to strep or mono. She has tried nothing for the symptoms. The treatment provided no relief.       Constitution: Positive for appetite change, chills, fatigue and fever. Negative for generalized weakness.   HENT: Positive for congestion, postnasal drip and sore throat. Negative for ear pain, sinus pain, sinus pressure, trouble swallowing and voice change.    Neck: Negative for neck pain, neck stiffness and painful lymph nodes.   Cardiovascular: Negative for chest pain and sob on exertion.   Eyes: Negative for eye discharge and eye redness.   Respiratory: Positive for cough. Negative for chest tightness, sputum production, shortness of breath, wheezing and asthma.    Gastrointestinal: Negative for abdominal pain, nausea, vomiting and diarrhea.   Genitourinary: Negative for dysuria and hematuria.   Musculoskeletal: Negative for joint pain and joint swelling.   Skin: Negative for rash.   Allergic/Immunologic: Negative for asthma.   Neurological: Positive for headaches. " Negative for light-headedness.   Hematologic/Lymphatic: Negative for swollen lymph nodes and easy bruising/bleeding. Does not bruise/bleed easily.       Objective:      Physical Exam   Constitutional: She is oriented to person, place, and time. She appears well-developed and well-nourished. She is cooperative.  Non-toxic appearance. She does not have a sickly appearance. She does not appear ill. No distress. well-groomed  HENT:   Head: Normocephalic and atraumatic.   Ears:   Right Ear: Hearing, tympanic membrane, external ear and ear canal normal.   Left Ear: Hearing, tympanic membrane, external ear and ear canal normal.   Nose: Rhinorrhea present. No mucosal edema, purulent discharge or nasal deformity. No epistaxis. Right sinus exhibits no maxillary sinus tenderness and no frontal sinus tenderness. Left sinus exhibits no maxillary sinus tenderness and no frontal sinus tenderness.   Mouth/Throat: Uvula is midline and mucous membranes are normal. Mucous membranes are not pale. No trismus in the jaw. Normal dentition. No uvula swelling. Posterior oropharyngeal erythema (mild with clear pnd, airway patent with normal phonation and symmetrical soft palate elevation) present. No oropharyngeal exudate or posterior oropharyngeal edema. Tonsils are 0 on the right. Tonsils are 0 on the left. No tonsillar exudate.   Eyes: Conjunctivae and lids are normal. Right eye exhibits no discharge. Left eye exhibits no discharge. No scleral icterus.   Neck: Trachea normal and phonation normal. Neck supple. No edema present. No erythema present. No neck rigidity present.   Cardiovascular: Regular rhythm, normal heart sounds, intact distal pulses and normal pulses. Tachycardia present.      Comments: Tachy with fever, apical 104, regular rhythm   Pulmonary/Chest: Effort normal and breath sounds normal. No stridor. No respiratory distress. She has no decreased breath sounds. She has no wheezes. She has no rhonchi. She has no rales.     Comments: Talkative with no sob/song, mild dry cough noted at present. No diminishment.    Abdominal: Normal appearance. She exhibits no distension. Soft. There is no abdominal tenderness. There is no guarding.   Musculoskeletal: Normal range of motion.         General: No tenderness, deformity or edema. Normal range of motion.      Cervical back: She exhibits no tenderness.   Lymphadenopathy:     She has no cervical adenopathy.   Neurological: no focal deficit. She is alert and oriented to person, place, and time. She displays no weakness. She exhibits normal muscle tone. Coordination normal.   Skin: Skin is warm, dry, intact, not diaphoretic and not pale. jaundice  Psychiatric: She has a normal mood and affect. Her speech is normal and behavior is normal. Mood, judgment and thought content normal. Cognition and memory  Nursing note and vitals reviewed.        Office Visit on 01/06/2022   Component Date Value Ref Range Status    POC Rapid COVID 01/06/2022 Positive* Negative Final     Acceptable 01/06/2022 Yes   Final       Assessment:       1. COVID-19 virus detected    2. Screening for viral disease          Plan:         COVID-19 virus detected  -     promethazine-dextromethorphan (PROMETHAZINE-DM) 6.25-15 mg/5 mL Syrp; Take 5 mLs by mouth nightly as needed (cough).  Dispense: 90 mL; Refill: 0  -     benzonatate (TESSALON PERLES) 100 MG capsule; Take 1 capsule (100 mg total) by mouth every 6 (six) hours as needed for Cough.  Dispense: 30 capsule; Refill: 0    Screening for viral disease  -     POCT COVID-19 Rapid Screening           Medical Decision Making:   Clinical Tests:   Lab Tests: Ordered and Reviewed  Urgent Care Management:  Alert, nontoxic and in NAD. Afebrile.  Patient with no evidence of respiratory distress.  Patient with viral syndrome symptoms, no evidence secondary bacterial infection.  Will test for COVID, +, reviewed with pt.  Advised on COVID testing, signs and symptoms of  COVID, symptomatic management at home, signs and symptoms to seek emergency care, [facility] quarantine guidelines for COVID.  Patient verbalized understanding and agreement treatment plan.         Patient Instructions   Patient Education       COVID-19 Discharge Instructions   About this topic   Coronavirus disease 2019 is also known as COVID-19. It is a viral illness that infects the lungs. It is caused by a virus called SARS-associated coronavirus (SARS-CoV-2).  The signs of COVID-19 most often start a few days after you have been infected. In some people, it takes longer to show signs. Others never show signs of the infection. You may have a cough, fever, shaking chills and it may be hard to breathe. You may be very tired, have muscle aches, a headache or sore throat. Some people have an upset stomach or loose stools. Others lose their sense of smell or taste. You may not have these signs all the time and they may come and go while you are sick.  The virus spreads easily through droplets when you talk, sneeze, or cough. You can pass the virus to others when you are talking close together, singing, hugging, sharing food, or shaking hands. Doctors believe the germs also survive on surfaces like tables, door handles, and telephones. However, this is not a common way that COVID-19 spreads. Doctors believe you can also spread the infection even if you dont have any symptoms, but they do not know how that happens. This is why getting vaccinated is one of the best ways to keep you healthy and slow the spread of the virus.  Some people have a mild case of COVID-19 and are able to stay at home and away from others until they feel better. Others may need to be in the hospital if they are very sick. Some people with COVID-19 can have some symptoms for weeks or months. People with COVID-19 must isolate themselves. You can start to be around others when your doctor says it is safe to do so.       What care is needed at home?   Ask  your doctor what you need to do when you go home. Make sure you ask questions if you do not understand what the doctor says.  Drink lots of water, juice, or broth to replace fluids lost from a fever.  You may use cool mist humidifiers to help ease congestion and coughing.  Use 2 to 3 pillows to prop yourself up when you lie down to make it easier to breathe and sleep.  Do not smoke and do not drink beer, wine, and mixed drinks (alcohol).  To lower the chance of passing the infection to others, get a COVID-19 vaccine after your infection has resolved.  If you have not been fully vaccinated:  Wear a mask over your mouth and nose if you are around others who are not sick. Cloth masks work best if they have more than one layer of fabric.  Wash your hands often.  Stay home in a separate room, if possible, away from others. Only go out to get medical care.  Use a separate bathroom if possible.  Do not make food for others.  What follow-up care is needed?   Your doctor may ask you to make visits to the office to check on your progress. Be sure to keep these visits. Make sure you wear a mask at these visits.  If you can, tell the staff you have COVID-19 ahead of time so they can take extra care to stop the disease from spreading.  It may take a few weeks before your health returns to normal.  What drugs may be needed?   The doctor may order drugs to:  Help with breathing  Help with fever  Help with swelling in your airways and lungs  Control coughing  Ease a sore throat  Help a runny or stuffy nose  Will physical activity be limited?   You may have to limit your physical activity. Talk to your doctor about the right amount of activity for you. If you have been very sick with COVID-19, it can take some time to get your strength back.  Will there be any other care needed?   Doctors do not know how long you can pass the virus on to others after you are sick. This is why it is important to stay in a separate room, if possible,  when you are sick. For now, doctors are giving general guidelines for you to follow after you have been sick. Before you go around other people, you should:  Be fever free for 24 hours without taking any drugs to lower the fever  Have no symptoms of cough or shortness of breath  Wait at least 10 days after first having symptoms or your first positive test, and you need to be symptom free as above. Some experts suggest waiting 20 days if you have had a more severe infection.  Talk with your doctor about getting a COVID-19 vaccine.  What problems could happen?   Fluid loss. This is dehydration.  Short-term or long-term lung damage  Heart problems  Death  When do I need to call the doctor?   You are having so much trouble breathing that you can only say one or two words at a time.  You need to sit upright at all times to be able to breathe and/or cannot lie down.  You are very confused or cannot stay awake.  Your lips or skin start to turn blue or grey.  You think you might be having a medical emergency. Some examples of medical emergencies are:  Severe chest pain.  Not able to speak or move normally.  You have trouble breathing when talking or sitting still.  You have new shortness of breath.  You become weak or dizzy.  You have very dark urine or do not pass urine for more than 8 hours.  You have new or worsening COVID-19 symptoms like:  Fever  Cough  Feeling very tired  Shaking chills  Headache  Trouble swallowing  Throwing up  Loose stools  Reddish purple spots on your fingers or toes  Teach Back: Helping You Understand   The Teach Back Method helps you understand the information we are giving you. After you talk with the staff, tell them in your own words what you learned. This helps to make sure the staff has described each thing clearly. It also helps to explain things that may have been confusing. Before going home, make sure you can do these:  I can tell you about my condition.  I can tell you what may help  ease my breathing.  I can tell you what I can do to help avoid passing the infection to others.  I can tell you what I will do if I have trouble breathing; feel sleepy or confused; or my fingertips, fingernails, skin, or lips are blue.  Where can I learn more?   Centers for Disease Control and Prevention  https://www.cdc.gov/coronavirus/2019-ncov/about/index.html   Centers for Disease Control and Prevention  https://www.cdc.gov/coronavirus/2019-ncov/hcp/disposition-in-home-patients.html   World Health Organization  https://www.who.int/news-room/q-a-detail/n-s-dmnlgrkytbtbf   Last Reviewed Date   2021-10-05  Consumer Information Use and Disclaimer   This information is not specific medical advice and does not replace information you receive from your health care provider. This is only a brief summary of general information. It does NOT include all information about conditions, illnesses, injuries, tests, procedures, treatments, therapies, discharge instructions or life-style choices that may apply to you. You must talk with your health care provider for complete information about your health and treatment options. This information should not be used to decide whether or not to accept your health care providers advice, instructions or recommendations. Only your health care provider has the knowledge and training to provide advice that is right for you.  Copyright   Copyright © 2021 [employer] and its affiliates and/or licensors. All rights reserved.  ·   ·   ·   · Follow up with your primary care in 2-5 days if symptoms have not improved, or you may return here.  · If you were referred to a specialist, please follow up with that specialty.  · If you were prescribed antibiotics, please take them to completion.  · If you were prescribed a narcotic or any medication with sedative effects, do not drive or operate heavy equipment or machinery while taking these medications.  · You must understand that you have  received treatment at an Urgent Care facility only, and that you may be released before all of your medical problems are known or treated. Urgent Care facilities are not equipped to handle life threatening emergencies. It is recommended that you go to an Emergency Department for further evaluation of worsening or concerning symptoms, or possibly life threatening conditions as discussed.                                        If you  smoke, please stop smoking

## 2022-01-06 NOTE — LETTER
[address], [facility] [address] ? [place], [address] ? Phone [phone] ? Fax [phone]           Return to Work/School Status    Patient: Janki Mix  YOB: 1985   Date: 01/06/2022      [facility] has adopted CDCs time-based return to work/school strategy for persons with confirmed or suspected COVID19. [facility] does not recommend using a test-based strategy for returning to work/school after COVID-19 infection. [facility] has reported prolonged positive test results without evidence of infectiousness. At this time, positive specimens capable of producing disease have not been isolated more than 9 days after onset of illness.    Confirmed covid cases should quarantine through 1/10/22, return to work or school 1/11/22 as long as no fever, improving symptoms with mask wearing and social distancing days 6-10.  Asymptomatic persons with confirmed COVID-19 can return to work after:   At least 10 days have passed since the positive laboratory test and the person remains asymptomatic.  More information about the science behind the symptom-based return to work/school can be found at: https://www.cdc.gov/coronavirus/2019-ncov/community/ibosdvag-lrbpwyhrbgw-cfcvcuxxs.html    Sincerely,    Katty Kennedy NP

## 2022-04-04 ENCOUNTER — PATIENT MESSAGE (OUTPATIENT)
Dept: ADMINISTRATIVE | Facility: HOSPITAL | Age: 37
End: 2022-04-04
Payer: MEDICAID

## 2022-04-27 LAB
CHOLESTEROL, TOTAL: 191 MG/DL
HDLC SERPL-MCNC: 44 MG/DL
LDLC SERPL CALC-MCNC: 127 MG/DL (ref 0–160)
TRIGL SERPL-MCNC: 110 MG/DL
VLDL CHOLESTEROL: 20 MG/DL

## 2022-07-11 ENCOUNTER — PATIENT MESSAGE (OUTPATIENT)
Dept: ADMINISTRATIVE | Facility: HOSPITAL | Age: 37
End: 2022-07-11
Payer: MEDICAID

## 2022-08-09 ENCOUNTER — PATIENT OUTREACH (OUTPATIENT)
Dept: ADMINISTRATIVE | Facility: HOSPITAL | Age: 37
End: 2022-08-09
Payer: MEDICAID

## 2022-10-13 ENCOUNTER — PATIENT OUTREACH (OUTPATIENT)
Dept: ADMINISTRATIVE | Facility: HOSPITAL | Age: 37
End: 2022-10-13
Payer: MEDICAID

## 2022-10-13 NOTE — PROGRESS NOTES
Attempted to contact pt in reference to overdue cervical cancer screening. Unable to contact. No answer. Message left.

## 2022-11-15 ENCOUNTER — OFFICE VISIT (OUTPATIENT)
Dept: URGENT CARE | Facility: CLINIC | Age: 37
End: 2022-11-15
Payer: MEDICAID

## 2022-11-15 VITALS — HEIGHT: 62 IN | WEIGHT: 215 LBS | BODY MASS INDEX: 39.56 KG/M2

## 2022-12-27 ENCOUNTER — PATIENT OUTREACH (OUTPATIENT)
Dept: ADMINISTRATIVE | Facility: HOSPITAL | Age: 37
End: 2022-12-27
Payer: MEDICAID

## 2023-02-06 LAB
HPV APTIMA: NEGATIVE
PAP RECOMMENDATION EXT: NORMAL

## 2023-03-09 ENCOUNTER — PATIENT OUTREACH (OUTPATIENT)
Dept: ADMINISTRATIVE | Facility: HOSPITAL | Age: 38
End: 2023-03-09
Payer: MEDICAID

## 2023-03-15 ENCOUNTER — PATIENT OUTREACH (OUTPATIENT)
Dept: ADMINISTRATIVE | Facility: HOSPITAL | Age: 38
End: 2023-03-15
Payer: MEDICAID

## 2024-02-21 ENCOUNTER — OFFICE VISIT (OUTPATIENT)
Dept: URGENT CARE | Facility: CLINIC | Age: 39
End: 2024-02-21
Payer: MEDICAID

## 2024-02-21 VITALS
DIASTOLIC BLOOD PRESSURE: 84 MMHG | HEART RATE: 108 BPM | BODY MASS INDEX: 35.15 KG/M2 | HEIGHT: 62 IN | RESPIRATION RATE: 20 BRPM | SYSTOLIC BLOOD PRESSURE: 123 MMHG | WEIGHT: 191 LBS | OXYGEN SATURATION: 99 % | TEMPERATURE: 99 F

## 2024-02-21 DIAGNOSIS — R05.9 COUGH, UNSPECIFIED TYPE: ICD-10-CM

## 2024-02-21 DIAGNOSIS — H66.002 NON-RECURRENT ACUTE SUPPURATIVE OTITIS MEDIA OF LEFT EAR WITHOUT SPONTANEOUS RUPTURE OF TYMPANIC MEMBRANE: Primary | ICD-10-CM

## 2024-02-21 LAB
CTP QC/QA: YES
CTP QC/QA: YES
POC MOLECULAR INFLUENZA A AGN: NEGATIVE
POC MOLECULAR INFLUENZA B AGN: NEGATIVE
SARS-COV-2 AG RESP QL IA.RAPID: NEGATIVE

## 2024-02-21 PROCEDURE — 87502 INFLUENZA DNA AMP PROBE: CPT | Mod: QW,S$GLB,, | Performed by: PHYSICIAN ASSISTANT

## 2024-02-21 PROCEDURE — 99213 OFFICE O/P EST LOW 20 MIN: CPT | Mod: S$GLB,,, | Performed by: PHYSICIAN ASSISTANT

## 2024-02-21 PROCEDURE — 87811 SARS-COV-2 COVID19 W/OPTIC: CPT | Mod: QW,S$GLB,, | Performed by: PHYSICIAN ASSISTANT

## 2024-02-21 RX ORDER — GUAIFENESIN 100 MG/5ML
400 SOLUTION ORAL 3 TIMES DAILY PRN
Qty: 236 ML | Refills: 0 | Status: SHIPPED | OUTPATIENT
Start: 2024-02-21 | End: 2024-03-02

## 2024-02-21 RX ORDER — FLUTICASONE PROPIONATE 50 MCG
1 SPRAY, SUSPENSION (ML) NASAL 2 TIMES DAILY PRN
Qty: 15 G | Refills: 0 | Status: SHIPPED | OUTPATIENT
Start: 2024-02-21

## 2024-02-21 RX ORDER — AMOXICILLIN AND CLAVULANATE POTASSIUM 600; 42.9 MG/5ML; MG/5ML
875 POWDER, FOR SUSPENSION ORAL EVERY 12 HOURS
Qty: 146 ML | Refills: 0 | Status: SHIPPED | OUTPATIENT
Start: 2024-02-21 | End: 2024-03-02

## 2024-02-21 NOTE — PROGRESS NOTES
"Subjective:      Patient ID: Janki Mix is a 38 y.o. female.    Vitals:  height is 5' 2" (1.575 m) and weight is 86.6 kg (191 lb). Her oral temperature is 98.8 °F (37.1 °C). Her blood pressure is 123/84 and her pulse is 108. Her respiration is 20 and oxygen saturation is 99%.     Chief Complaint: Sinus Problem    Sinus Problem  This is a new problem. The current episode started today. The problem has been gradually worsening since onset. There has been no fever. Her pain is at a severity of 5/10. The pain is mild. Associated symptoms include chills, congestion, coughing, headaches, sinus pressure and a sore throat. Pertinent negatives include no sneezing. (Sweats ) Past treatments include nothing. The treatment provided no relief.       Constitution: Positive for chills.   HENT:  Positive for congestion, sinus pressure and sore throat.    Respiratory:  Positive for cough.    Allergic/Immunologic: Negative for sneezing.   Neurological:  Positive for headaches.      Objective:     Physical Exam   Constitutional: She is oriented to person, place, and time. She appears well-developed. She is cooperative.  Non-toxic appearance. She does not appear ill. No distress.   HENT:   Head: Normocephalic and atraumatic.   Ears:   Right Ear: Hearing, external ear and ear canal normal. Tympanic membrane is scarred. Tympanic membrane is not erythematous, not retracted and not bulging. impacted cerumen  Left Ear: Hearing, external ear and ear canal normal. Tympanic membrane is erythematous and bulging. Tympanic membrane is not retracted. A middle ear effusion (purulent) is present. impacted cerumen  Nose: Rhinorrhea and congestion present.   Mouth/Throat: Uvula is midline, oropharynx is clear and moist and mucous membranes are normal. Mucous membranes are moist. Mucous membranes are not dry. No trismus in the jaw. No uvula swelling. No oropharyngeal exudate, posterior oropharyngeal edema, posterior oropharyngeal erythema, " tonsillar abscesses or cobblestoning. No tonsillar exudate. Oropharynx is clear.   Eyes: Conjunctivae and lids are normal. No scleral icterus.   Neck: Phonation normal. Neck supple.   Cardiovascular: Normal rate, regular rhythm and normal heart sounds.   No murmur heard.Exam reveals no gallop and no friction rub.   Pulmonary/Chest: Effort normal and breath sounds normal. No stridor. No respiratory distress. She has no wheezes. She has no rhonchi. She has no rales.   Abdominal: Normal appearance.   Neurological: She is alert and oriented to person, place, and time. Coordination normal.   Skin: Skin is intact, not diaphoretic and not pale.   Psychiatric: Her speech is normal and behavior is normal. Judgment and thought content normal.   Nursing note and vitals reviewed.      Assessment:     1. Non-recurrent acute suppurative otitis media of left ear without spontaneous rupture of tympanic membrane    2. Cough, unspecified type        Plan:       Non-recurrent acute suppurative otitis media of left ear without spontaneous rupture of tympanic membrane  -     amoxicillin-clavulanate (AUGMENTIN) 600-42.9 mg/5 mL SusR; Take 7.3 mLs (876 mg total) by mouth every 12 (twelve) hours. for 10 days  Dispense: 146 mL; Refill: 0  -     fluticasone propionate (FLONASE) 50 mcg/actuation nasal spray; 1 spray (50 mcg total) by Each Nostril route 2 (two) times daily as needed.  Dispense: 15 g; Refill: 0  -     guaiFENesin 100 mg/5 ml (MUCINEX FAST-MAX CHEST-CONGEST) 100 mg/5 mL syrup; Take 20 mLs (400 mg total) by mouth 3 (three) times daily as needed for Cough or Congestion.  Dispense: 236 mL; Refill: 0    Cough, unspecified type  -     SARS Coronavirus 2 Antigen, POCT Manual Read  -     POCT Influenza A/B MOLECULAR  -     fluticasone propionate (FLONASE) 50 mcg/actuation nasal spray; 1 spray (50 mcg total) by Each Nostril route 2 (two) times daily as needed.  Dispense: 15 g; Refill: 0  -     guaiFENesin 100 mg/5 ml (MUCINEX FAST-MAX  CHEST-CONGEST) 100 mg/5 mL syrup; Take 20 mLs (400 mg total) by mouth 3 (three) times daily as needed for Cough or Congestion.  Dispense: 236 mL; Refill: 0      Results for orders placed or performed in visit on 02/21/24   SARS Coronavirus 2 Antigen, POCT Manual Read   Result Value Ref Range    SARS Coronavirus 2 Antigen Negative Negative     Acceptable Yes    POCT Influenza A/B MOLECULAR   Result Value Ref Range    POC Molecular Influenza A Ag Negative Negative, Not Reported    POC Molecular Influenza B Ag Negative Negative, Not Reported     Acceptable Yes      Repeat covid test if symptoms do not improve/worsen. Alternate ibuprofen and tylenol as needed for fever/pain/body aches every 4-6 hours. Rest, increase PO fluids.   Discussed with patient the importance of f/u with their primary care provider. Urged to go to the ER for any worsening signs or symptoms.

## 2024-02-21 NOTE — LETTER
February 21, 2024      [place] [facility]  [address], [address]  [place] [address]  Phone: [phone]  Fax: [phone]       Patient: Janki Mix   YOB: 1985  Date of Visit: 02/21/2024    To Whom It May Concern:    Estephanie Mix  was at [facility] on 02/21/2024. The patient may return to work/school in 1-2 days as long as she is fever free and symptoms improve with no restrictions. If you have any questions or concerns, or if I can be of further assistance, please do not hesitate to contact me.    Sincerely,    Gloria Mathews PA-C

## 2024-04-10 DIAGNOSIS — R73.03 PREDIABETES: ICD-10-CM

## 2025-06-20 ENCOUNTER — OFFICE VISIT (OUTPATIENT)
Dept: URGENT CARE | Facility: CLINIC | Age: 40
End: 2025-06-20
Payer: MEDICAID

## 2025-06-20 ENCOUNTER — TELEPHONE (OUTPATIENT)
Dept: URGENT CARE | Facility: CLINIC | Age: 40
End: 2025-06-20

## 2025-06-20 VITALS
DIASTOLIC BLOOD PRESSURE: 60 MMHG | HEIGHT: 62 IN | OXYGEN SATURATION: 98 % | RESPIRATION RATE: 19 BRPM | SYSTOLIC BLOOD PRESSURE: 90 MMHG | BODY MASS INDEX: 21.53 KG/M2 | TEMPERATURE: 99 F | HEART RATE: 68 BPM | WEIGHT: 117 LBS

## 2025-06-20 DIAGNOSIS — R50.9 FEVER, UNSPECIFIED FEVER CAUSE: ICD-10-CM

## 2025-06-20 DIAGNOSIS — J01.90 ACUTE BACTERIAL SINUSITIS: Primary | ICD-10-CM

## 2025-06-20 DIAGNOSIS — J02.9 ACUTE PHARYNGITIS, UNSPECIFIED ETIOLOGY: ICD-10-CM

## 2025-06-20 DIAGNOSIS — B96.89 ACUTE BACTERIAL SINUSITIS: Primary | ICD-10-CM

## 2025-06-20 LAB
CTP QC/QA: YES
MOLECULAR STREP A: NEGATIVE
POC MOLECULAR INFLUENZA A AGN: NEGATIVE
POC MOLECULAR INFLUENZA B AGN: NEGATIVE
SARS-COV+SARS-COV-2 AG RESP QL IA.RAPID: NEGATIVE

## 2025-06-20 RX ORDER — AMOXICILLIN AND CLAVULANATE POTASSIUM 875; 125 MG/1; MG/1
1 TABLET, FILM COATED ORAL EVERY 12 HOURS
Qty: 14 TABLET | Refills: 0 | Status: SHIPPED | OUTPATIENT
Start: 2025-06-20 | End: 2025-06-27

## 2025-06-20 RX ORDER — DEXTROMETHORPHAN HYDROBROMIDE, GUAIFENESIN, PHENYLEPHRINE HYDROCHLORIDE 17.5; 400; 1 MG/1; MG/1; MG/1
1 TABLET ORAL
Qty: 20 TABLET | Refills: 0 | Status: SHIPPED | OUTPATIENT
Start: 2025-06-20

## 2025-06-20 RX ORDER — FLUTICASONE PROPIONATE 50 MCG
1 SPRAY, SUSPENSION (ML) NASAL 2 TIMES DAILY PRN
Qty: 16 ML | Refills: 0 | Status: SHIPPED | OUTPATIENT
Start: 2025-06-20

## 2025-06-20 RX ORDER — BENZONATATE 200 MG/1
200 CAPSULE ORAL 3 TIMES DAILY PRN
Qty: 30 CAPSULE | Refills: 0 | Status: SHIPPED | OUTPATIENT
Start: 2025-06-20 | End: 2025-06-30

## 2025-06-20 NOTE — PROGRESS NOTES
"Subjective:      Patient ID: Janki Mix is a 39 y.o. female.    Vitals:  height is 5' 2" (1.575 m) and weight is 53.1 kg (117 lb). Her oral temperature is 98.8 °F (37.1 °C). Her blood pressure is 90/60 and her pulse is 68. Her respiration is 19 and oxygen saturation is 98%.     Chief Complaint: Sore Throat    Sore Throat   This is a new problem. Episode onset: 2 days. The maximum temperature recorded prior to her arrival was 102 - 102.9 F. The pain is at a severity of 9/10. The pain is severe. Associated symptoms include congestion, coughing and headaches. Pertinent negatives include no abdominal pain, diarrhea, ear pain or vomiting. Associated symptoms comments: Chills, runny nose, nausea yesterday, glands feel swollen and sore. Treatments tried: dayquil, nyquil, ibuprofen.       HENT:  Positive for congestion and sore throat. Negative for ear pain.    Respiratory:  Positive for cough.    Gastrointestinal:  Negative for abdominal pain, vomiting and diarrhea.   Neurological:  Positive for headaches.      Objective:     Physical Exam   Constitutional: She is oriented to person, place, and time. She appears well-developed. She is cooperative.  Non-toxic appearance. She does not appear ill. No distress.   HENT:   Head: Normocephalic and atraumatic.   Ears:   Right Ear: Hearing, external ear and ear canal normal. Tympanic membrane is scarred. Tympanic membrane is not erythematous, not retracted and not bulging.   Left Ear: Hearing, external ear and ear canal normal. Tympanic membrane is not erythematous, not retracted and not bulging. A middle ear effusion is present.   Nose: Mucosal edema, rhinorrhea and congestion present. Right sinus exhibits maxillary sinus tenderness and frontal sinus tenderness. Left sinus exhibits maxillary sinus tenderness and frontal sinus tenderness.   Mouth/Throat: Uvula is midline and mucous membranes are normal. Mucous membranes are moist. Mucous membranes are not dry. No trismus " in the jaw. No uvula swelling. Posterior oropharyngeal erythema present. No oropharyngeal exudate, posterior oropharyngeal edema, tonsillar abscesses or cobblestoning. No tonsillar exudate. Oropharynx is clear.   Eyes: Conjunctivae and lids are normal. No scleral icterus.   Neck: Phonation normal. Neck supple.   Cardiovascular: Normal rate, regular rhythm and normal heart sounds.   No murmur heard.Exam reveals no gallop and no friction rub.   Pulmonary/Chest: Effort normal and breath sounds normal. No stridor. No respiratory distress. She has no wheezes. She has no rhonchi. She has no rales.   Abdominal: Normal appearance.   Neurological: She is alert and oriented to person, place, and time. Coordination normal.   Skin: Skin is intact, not diaphoretic and not pale.   Psychiatric: Her speech is normal and behavior is normal. Judgment and thought content normal.   Nursing note and vitals reviewed.      Assessment:     1. Acute bacterial sinusitis    2. Acute pharyngitis, unspecified etiology    3. Fever, unspecified fever cause        Plan:       Acute bacterial sinusitis  -     amoxicillin-clavulanate 875-125mg (AUGMENTIN) 875-125 mg per tablet; Take 1 tablet by mouth every 12 (twelve) hours. for 7 days  Dispense: 14 tablet; Refill: 0  -     fluticasone propionate (FLONASE) 50 mcg/actuation nasal spray; 1 spray (50 mcg total) by Each Nostril route 2 (two) times daily as needed.  Dispense: 16 mL; Refill: 0  -     phenylephrine-DM-guaiFENesin (DECONEX DMX) 10-17.5-400 mg Tab; Take 1 tablet by mouth every 4 to 6 hours as needed (congestion/cough).  Dispense: 20 tablet; Refill: 0  -     benzonatate (TESSALON) 200 MG capsule; Take 1 capsule (200 mg total) by mouth 3 (three) times daily as needed for Cough.  Dispense: 30 capsule; Refill: 0    Acute pharyngitis, unspecified etiology  -     SARS Coronavirus 2 Antigen, POCT Manual Read  -     POCT Strep A, Molecular  -     POCT Influenza A/B MOLECULAR  -     fluticasone  propionate (FLONASE) 50 mcg/actuation nasal spray; 1 spray (50 mcg total) by Each Nostril route 2 (two) times daily as needed.  Dispense: 16 mL; Refill: 0  -     phenylephrine-DM-guaiFENesin (DECONEX DMX) 10-17.5-400 mg Tab; Take 1 tablet by mouth every 4 to 6 hours as needed (congestion/cough).  Dispense: 20 tablet; Refill: 0    Fever, unspecified fever cause      Results for orders placed or performed in visit on 06/20/25   SARS Coronavirus 2 Antigen, POCT Manual Read    Collection Time: 06/20/25  8:36 AM   Result Value Ref Range    SARS Coronavirus 2 Antigen Negative Negative, Presumptive Negative     Acceptable Yes    POCT Strep A, Molecular    Collection Time: 06/20/25  8:41 AM   Result Value Ref Range    Molecular Strep A, POC Negative Negative     Acceptable Yes    POCT Influenza A/B MOLECULAR    Collection Time: 06/20/25  8:47 AM   Result Value Ref Range    POC Molecular Influenza A Ag Negative Negative    POC Molecular Influenza B Ag Negative Negative     Acceptable Yes      Alternate ibuprofen and tylenol as needed for fever/pain/body aches every 4-6 hours. Rest, increase PO fluids.   Discussed with patient the importance of f/u with their primary care provider. Urged to go to the ER for any worsening signs or symptoms.

## 2025-06-20 NOTE — LETTER
June 20, 2025      [facility] [place]  [address] [place], [address]  [place] [address]  Phone: [phone]  Fax: [phone]       Patient: Janki Mix   YOB: 1985  Date of Visit: 06/20/2025    To Whom It May Concern:    Estephanie Mix  was at [facility] on 06/20/2025. The patient may return to work/school on 6/23/2025 with no restrictions. If you have any questions or concerns, or if I can be of further assistance, please do not hesitate to contact me.    Sincerely,    Sol Parekh MA     
How Severe Are Your Warts?: moderate
Is This A New Presentation, Or A Follow-Up?: Warts
Treatment Number (Optional): 1

## 2025-06-20 NOTE — TELEPHONE ENCOUNTER
Per [facility] pharmacy, patient received Rx amoxil 875 mg 3 days ago from outside urgent care. Advised not to fill augmentin. Patient should take the amoxil as prescribed.